# Patient Record
Sex: FEMALE | Race: BLACK OR AFRICAN AMERICAN | NOT HISPANIC OR LATINO | Employment: FULL TIME | URBAN - METROPOLITAN AREA
[De-identification: names, ages, dates, MRNs, and addresses within clinical notes are randomized per-mention and may not be internally consistent; named-entity substitution may affect disease eponyms.]

---

## 2017-01-27 ENCOUNTER — HOSPITAL ENCOUNTER (OUTPATIENT)
Dept: PHYSICAL THERAPY | Facility: HOSPITAL | Age: 47
Setting detail: THERAPIES SERIES
Discharge: HOME | End: 2017-01-27

## 2017-01-27 DIAGNOSIS — S96.912A STRAIN OF LEFT FOOT, INITIAL ENCOUNTER: Primary | ICD-10-CM

## 2017-01-27 DIAGNOSIS — M79.672 LEFT FOOT PAIN: ICD-10-CM

## 2017-01-27 PROCEDURE — 97110 THERAPEUTIC EXERCISES: CPT

## 2017-01-27 PROCEDURE — 97161 PT EVAL LOW COMPLEX 20 MIN: CPT

## 2017-01-27 PROCEDURE — 97033 APP MDLTY 1+IONTPHRSIS EA 15: CPT

## 2017-01-27 NOTE — PROGRESS NOTES
"    Outpatient Physical Therapy Ortho Initial Evaluation  ELENA SmithOsgood     Patient Name: Юлия Najera  : 1970  MRN: [id number]  Today's Date: 2017      Visit Date: 2017    There is no problem list on file for this patient.       Past Medical History   Diagnosis Date   • Arthritis    • Autoimmune disease    • Frequent headaches    • Hashimoto's disease         History reviewed. No pertinent past surgical history.    Visit Dx:     ICD-10-CM ICD-9-CM   1. Strain of left foot, initial encounter S96.912A 845.10   2. Left foot pain M79.672 729.5             Patient History       17 1300          History    Chief Complaint Difficulty Walking;Muscle tenderness;Pain  -LN      Type of Pain Foot pain   left lateral foot  -LN      Date Current Problem(s) Began 16  -LN      Brief Description of Current Complaint Patient was coming down off steps off the [employer] at work on 2016 and her left foot hit a trough and she rolled her ankle and foot. Patient had immediate pain and couldn't put much weight on it. She went to [facility] and had  X-ray which was normal per patient.  Patient diagnosed with left ankle and foot sprain. Patient was on walker for a couple days. Used ace bandage & now using PRN. Patient was doing well with no c/o pain and returned to work on full duty and began having pain return in lateral foot slowly and has progressively worsened; no ankle pain reported. Patient now on sit down duty. She had been back to work on full duty for about 2 weeks; Pain increases after a full day of work.   -LN      Previous treatment for THIS PROBLEM Medication  -LN      Onset Date- PT 2017  -LN      Patient/Caregiver Goals Relieve pain;Return to prior level of function;Return to work;Know what to do to help the symptoms   \"No pain and return to regular activities\"   -LN      Current Tobacco Use Smoker  -LN      Occupation/sports/leisure activities works at [employer]- presently " "on sit down duty at work; likes to paint and play video games  -LN      How has patient tried to help current problem? ibuprofen, biofreeze, massage, soaks, MH/CP.  has also tried home TENS unit- but it makes foot \"feel weird.\"   -LN      What clinical tests have you had for this problem? X-ray  -LN      Results of Clinical Tests normal per patient  -LN      Related/Recent Hospitalizations No  -LN      History of Previous Related Injuries none  -LN      Pain     Pain Location Foot   lateral left foot  -LN      Pain at Present 1  -LN      Pain at Best 0  -LN      Pain at Worst 10   worst in past week 5/10. 10/10 at time of injury.  -LN      Pain Frequency Intermittent   \"every other day pain\"  -LN      Pain Description Aching;Throbbing  -LN      What Performance Factors Make the Current Problem(s) WORSE? standing/walking/stairs/standing and pivoting  -LN      What Performance Factors Make the Current Problem(s) BETTER? sitting/rest  -LN      Tolerance Time- Standing limited  -LN      Tolerance Time- Sitting no difficulty  -LN      Tolerance Time- Walking limited  -LN      Is your sleep disturbed? No  -LN      Difficulties at work? yes- with walking/standing/steps/standing and pivoting  -LN      Difficulties with ADL's? none reported  -LN      Difficulties with recreational activities? none  -LN      Fall Risk Assessment    Any falls in the past year: Yes  -LN      Number of falls reported in the last 12 months 1  -LN      Factors that contributed to the fall: Uneven surface   step at work  -LN      Services    Prior Rehab/Home Health Experiences Yes  -LN      Where was the prior experience with Rehab/Home Health on shoulder  -LN      Are you currently receiving Home Health services No  -LN      Do you plan to receive Home Health services in the near future No  -LN      Daily Activities    Primary Language English  -LN      Are you able to read Yes  -LN      Are you able to write Yes  -LN      How does patient learn " "best? Listening;Demonstration  -LN      Teaching needs identified Home Exercise Program   Risks and benefits of treatment explained to patient  -LN      Patient is concerned about/has problems with Climbing Stairs;Performing home management (household chores, shopping, care of dependents);Performing job responsibilities/community activities (work, school,;Standing;Walking  -LN      Does patient have problems with the following? Depression;Anxiety  -LN      Barriers to learning None  -LN      Pt Participated in POC and Goals Yes  -LN      Safety    Are you being hurt, hit, or frightened by anyone at home or in your life? No  -LN      Are you being neglected by a caregiver No  -LN        User Key  (r) = Recorded By, (t) = Taken By, (c) = Cosigned By    Initials Name Provider Type    LN Meli Weinstein, PT Physical Therapist                PT Ortho       01/27/17 1400    Subjective Comments    Subjective Comments \"It doesn't hurt all the time; it just depends on what I do that day; it's more like an every other day pain.\" \"It has never really swelled and it didn't bruise.\"   -LN    Precautions and Contraindications    Precautions/Limitations no known precautions/limitations  -LN    Subjective Pain    Able to rate subjective pain? yes  -LN    Pre-Treatment Pain Level 1  -LN    Post-Treatment Pain Level 1  -LN    Subjective Pain Comment c/o minimal itching in foot after ionto.   -LN    Pain Assessment    Pain Assessment 0-10  -LN    Sensation    Sensation WNL? WFL  -LN    Light Touch No apparent deficits  -LN    Additional Comments no c/o N/T in foot \"lately\" Did have problems with foot feeling really cold- that is gone now.   -LN    Foot/Ankle Palpation    Lateral Foot Left:;Tender;Swollen  -LN    Peroneals Left:;Tender   longus and brevis  -LN    Cuneiform Left:;Tender;Swollen   lateral  -LN    Ankle Accessory Motions    Distal tib/fib A-P glide Left:;WNL  -LN    Talocrural (talotibial) distraction Left:;WNL  " -LN    Talocrural (talotibial) A-P glide Left:;WNL  -LN    Subtalar distraction Left:;WNL  -LN    Subtalar medial/lateral tilt Left:;WNL  -LN    Mid-tarsal dorsal/plantar glide Left:;WNL;Left pain  -LN    Toes Accessory Motion    Toes Accessory Motions Tested? --   toe ROM WNL  -LN    Ankle/Foot Special Tests    Anterior drawer (ATFL lesion) Negative  -LN    Talar tilt test (instability) Negative  -LN    White test (Achilles’ tendon rupture) Negative  -LN    Amairani’s sign (DVT) Negative  -LN    Tib/Fib Compression Negative  -LN    Inversion Stress Test Left:;Positive   pain  -LN    Eversion Stress Test Left:;Positive   pain  -LN    Left Ankle    Dorsiflexion AROM Deficit -1 degrees  -LN    Plantarflexion AROM Deficit 64 degrees  -LN    Inversion AROM Deficit 30 degrees with pain  -LN    Eversion AROM Deficit 12 degrees with pain  -LN    Right Ankle    Dorsiflexion AROM Deficit 3 degrees  -LN    Plantarflexion AROM Deficit 64 degrees   -LN    Inversion AROM Deficit 30 degrees   -LN    Eversion AROM Deficit 16 degrees  -LN    Left Ankle/Foot    Ankle PF Gross Movement (5/5) normal  -LN    Ankle Dorsiflexion Gross Movement (5/5) normal  -LN    Subtalar Inversion Gross Movement (4-/5) good minus   discomfort  -LN    Subtalar Eversion Gross Movement (3/5) fair   pain  -LN    Right Ankle/Foot    Ankle PF Gross Movement (5/5) normal  -LN    Ankle Dorsiflexion Gross Movement (5/5) normal  -LN    Subtalar Inversion Gross Movement (5/5) normal  -LN    Subtalar Eversion Gross Movement (5/5) normal  -LN    Lower Extremity Flexibility    Hamstrings Moderately limited;Bilateral:  -LN    Gastrocnemius Left:;Moderately limited  -LN    Ankle Girth    Mid Tarsal- Right 25.6 cm   1 inch below ankle  -LN    Mid Tarsal- Left 26.5   1 inch below ankle  -LN    Above Malleolus- Right 27 cm   midankle  -LN    Above Malleolus- Left 27.5 cm   midankle  -LN    Ankle Girth Other 1 right & left 2 inches below ankle= 26 cm.   -LN    Gait  Assessment/Treatment    Gait, [place] Level independent  -LN    Gait, Assistive Device other (see comments)   none  -LN    Gait, Gait Deviations left:;antalgic   minimal  -LN    Gait, Impairments decreased flexibility;ROM decreased;strength decreased;pain  -LN      User Key  (r) = Recorded By, (t) = Taken By, (c) = Cosigned By    Initials Name Provider Type    LIZ Weinstein, PT Physical Therapist                            Therapy Education       01/27/17 1619    Therapy Education    Given --   First dose skin review completed after ionto. Normal skin reaction noted-  Nominal pinkness noted lateral left foot; Patient to remove tape and apply lotion as needed.   -LN      01/27/17 1601    Therapy Education    Given HEP;Symptoms/condition management;Pain management;Edema management   Patient to continue to use MH/biofreeze at home PRN.   -LN    Program New  -LN    How Provided Verbal;Demonstration;Written  -LN    Provided to Patient;Caregiver  -LN    Level of Understanding Teach back education performed;Verbalized;Demonstrated  -LN      User Key  (r) = Recorded By, (t) = Taken By, (c) = Cosigned By    Initials Name Provider Type    LIZ Weinstein, PT Physical Therapist                PT OP Goals       01/27/17 1400          PT Short Term Goals    STG Date to Achieve 02/10/17  -LN      STG 1 Patient to have improved left ankle AROM to 3 degrees df and 16 degrees eversion (= to right) & painfree.   -LN      STG 2 Patient able to stand for 45 minutes to 1 hour with left foot pain no >3/10.   -LN      STG 3 Patient to have improved left ankle strength by 1/2 muscle grade for inversion and eversion.   -LN      Long Term Goals    LTG Date to Achieve 02/24/17  -LN      LTG 1 Patient independent with HEP issued by therapist.  -LN      LTG 2 Patient to have improved left ankle strength to 4+-5/5 to avoid any future ankle & foot injuries.   -LN      LTG 3 Nominal tenderness noted lateral left foot.    -LN      LTG 4 Patient able to return to full duty at work and work a full day with left foot pain no > than 2/10.   -LN      Time Calculation    PT Goal Re-Cert Due Date 02/24/17  -LN        User Key  (r) = Recorded By, (t) = Taken By, (c) = Cosigned By    Initials Name Provider Type    LN Meli Weinstein, PT Physical Therapist                PT Assessment/Plan       01/27/17 1604          PT Assessment    Functional Limitations Impaired gait;Impaired locomotion;Limitation in home management;Performance in work activities;Limitations in community activities;Limitations in functional capacity and performance  -LN      Impairments Muscle strength;Pain;Impaired flexibility;Gait;Edema;Locomotion;Range of motion  -LN      Assessment Comments Patient presents 7 weeks s/p fall at work with left ankle and foot sprain; ankle is now better, but with persistent left foot pain (increased after she returned to full duty at work). Patient with decreased left ankle df and eversion, decreased strength in left ankle eversion and inversion, moderate tenderness lateral left foot/peroneus longus and brevis tendons, minimal edema, decreased standing and walking tolerance and minimal antalgic gait. Patient with signs of left lateral foot strain.   -LN      Please refer to paper survey for additional self-reported information Yes  -LN      Rehab Potential Good  -LN      Patient/caregiver participated in establishment of treatment plan and goals Yes  -LN      Patient would benefit from skilled therapy intervention Yes  -LN      PT Plan    PT Frequency 2x/week;3x/week  -LN      Predicted Duration of Therapy Intervention (days/wks) 2-4 weeks   -LN      Planned CPT's? PT EVAL: 21195;PT RE-EVAL: 11989;PT MANUAL THERAPY EA 15 MIN: 28467;PT THER PROC EA 15 MIN: 34607;PT ELECTRICAL STIM UNATTEND: ;PT ULTRASOUND EA 15 MIN: 28075;PT HOT OR COLD PACK TREAT MCARE;PT IONTOPHORESIS EA 15 MIN: 90749  -LN      Physical Therapy Interventions  "(Optional Details) stretching;strengthening;ROM (Range of Motion);manual therapy techniques;modalities;joint mobilization;patient/family education;home exercise program;taping  -LN      PT Plan Comments Assess benefit of Kinesiotaping and iontophoresis. Progress with exercises as tolerated.   -LN        User Key  (r) = Recorded By, (t) = Taken By, (c) = Cosigned By    Initials Name Provider Type    LIZ Weinstein, PT Physical Therapist                Modalities       01/27/17 1400          Iontophoresis 65753    Rx Minutes 10   to lateral left foot- peroneus brevis tendon area  -LN      Milliamps 4  -LN      MA/Min 40  -LN      Dexamethasone used Yes  -LN      Patch Type Medium   First dose skin review completed.  -LN      Other Treatment Provided    MA/min 40  -LN      Taping / Bracing Kinesiotape applied using 1 \"I\" strip beginning at lateral foot and going under foot, around heel (avoiding achilles tendon) and ending at lateral foot and 1 smaller \"I\" strip for spatial correction lateral foot. Instructed patient in use and care of kinesiotape, including safe removal of tape. Patient to leave tape on up to 5 days and to trim/remove tape as needed. Patient also instructed to try sleeping with a sock on to help keep tape on overnight.   -LN        User Key  (r) = Recorded By, (t) = Taken By, (c) = Cosigned By    Initials Name Provider Type    LIZ Weinstein, PT Physical Therapist              Exercises       01/27/17 1400          Subjective Comments    Subjective Comments \"It doesn't hurt all the time; it just depends on what I do that day; it's more like an every other day pain.\" \"It has never really swelled and it didn't bruise.\"   -LN      Subjective Pain    Able to rate subjective pain? yes  -LN      Pre-Treatment Pain Level 1  -LN      Post-Treatment Pain Level 1  -LN      Subjective Pain Comment c/o minimal itching in foot after ionto.   -LN      Exercise 1    Exercise Name 1 AP  -LN      " Reps 1 10  -LN      Exercise 2    Exercise Name 2 active inversion & eversion  -LN      Reps 2 10  -LN      Exercise 3    Exercise Name 3 gastroc stretch with sheet  -LN      Reps 3 5  -LN      Time (Seconds) 3 10 seconds  -LN      Exercise 4    Exercise Name 4 supine hamstring stretch with sheet  -LN      Reps 4 5  -LN      Time (Seconds) 4 10 seconds  -LN        User Key  (r) = Recorded By, (t) = Taken By, (c) = Cosigned By    Initials Name Provider Type    LIZ Weinstein PT Physical Therapist                              Outcome Measures       01/27/17 1600          Lower Extremity Functional Index    Any of your usual work, housework or school activities 2  -LN      Your usual hobbies, recreational or sporting activities 4  -LN      Getting into or out of the bath 3  -LN      Walking between rooms 3  -LN      Putting on your shoes or socks 4  -LN      Squatting 2  -LN      Lifting an object, like a bag of groceries from the floor 4  -LN      Performing light activities around your home 4  -LN      Performing heavy activities around your home 3  -LN      Getting into or out of a car 4  -LN      Walking 2 blocks 4  -LN      Walking a mile 2  -LN      Going up or down 10 stairs (about 1 flight of stairs) 2  -LN      Standing for 1 hour 2  -LN      Sitting for 1 hour 4  -LN      Running on even ground 3  -LN      Running on uneven ground 3  -LN      Making sharp turns while running fast 2  -LN      Hopping 2  -LN      Rolling over in bed 4  -LN      Total 61  -LN      Functional Assessment    Outcome Measure Options Lower Extremity Functional Scale (LEFS)  -LN        User Key  (r) = Recorded By, (t) = Taken By, (c) = Cosigned By    Initials Name Provider Type    LIZ Weinstein PT Physical Therapist            Time Calculation:   Start Time: 1400  Stop Time: 1500  Time Calculation (min): 60 min     Therapy Charges for Today     Code Description Service Date Service Provider Modifiers Qty     98078464608 HC PT EVAL LOW COMPLEXITY 2 1/27/2017 Meli Weinstein, PT GP 1    01374625046 HC PT IONTOPHORESIS EA 15 MIN 1/27/2017 Meli Weinstein, PT GP 1    28984299362 HC PT THER PROC EA 15 MIN 1/27/2017 Meli Weinstein, PT GP 1          PT G-Codes  Outcome Measure Options: Lower Extremity Functional Scale (LEFS)         Meli Weinstein, PT  1/27/2017

## 2017-01-30 ENCOUNTER — HOSPITAL ENCOUNTER (OUTPATIENT)
Dept: PHYSICAL THERAPY | Facility: HOSPITAL | Age: 47
Setting detail: THERAPIES SERIES
Discharge: HOME | End: 2017-01-30

## 2017-01-30 PROCEDURE — 97110 THERAPEUTIC EXERCISES: CPT | Performed by: PHYSICAL THERAPIST

## 2017-01-30 NOTE — PROGRESS NOTES
"    Outpatient Physical Therapy Ortho Treatment Note  ELENA Wahl     Patient Name: Юлия Najera  : 1970  MRN: [id number]  Today's Date: 2017      Visit Date: 2017    Visit Dx:  No diagnosis found.    There is no problem list on file for this patient.       Past Medical History   Diagnosis Date   • Arthritis    • Autoimmune disease    • Frequent headaches    • Hashimoto's disease         No past surgical history on file.          PT Ortho       17 0650    Subjective Comments    Subjective Comments Patient reports she continues to have pain along lateral aspect of foot.  States she \"thinks\" tape may have helped some as she felt more supported, however she has been standing/walking quite a bit at work despite being on \"sit\" duty  -SP      User Key  (r) = Recorded By, (t) = Taken By, (c) = Cosigned By    Initials Name Provider Type    SP Shari Lopez, PT Physical Therapist                            PT Assessment/Plan       17 1826 17 1604       PT Assessment    Functional Limitations  Impaired gait;Impaired locomotion;Limitation in home management;Performance in work activities;Limitations in community activities;Limitations in functional capacity and performance  -LN     Impairments  Muscle strength;Pain;Impaired flexibility;Gait;Edema;Locomotion;Range of motion  -LN     Assessment Comments Patient continues to have significant tenderness to palpation along lateral aspect of left foot, but able to tolerate ther-ex without increased symptoms  -SP Patient presents 7 weeks s/p fall at work with left ankle and foot sprain; ankle is now better, but with persistent left foot pain (increased after she returned to full duty at work). Patient with decreased left ankle df and eversion, decreased strength in left ankle eversion and inversion, moderate tenderness lateral left foot/peroneus longus and brevis tendons, minimal edema, decreased standing and walking tolerance and " "minimal antalgic gait. Patient with signs of left lateral foot strain.   -LN     Please refer to paper survey for additional self-reported information  Yes  -LN     Rehab Potential  Good  -LN     Patient/caregiver participated in establishment of treatment plan and goals  Yes  -LN     Patient would benefit from skilled therapy intervention  Yes  -LN     PT Plan    PT Frequency  2x/week;3x/week  -LN     Predicted Duration of Therapy Intervention (days/wks)  2-4 weeks   -LN     Planned CPT's?  PT EVAL: 56148;PT RE-EVAL: 80076;PT MANUAL THERAPY EA 15 MIN: 07970;PT THER PROC EA 15 MIN: 58502;PT ELECTRICAL STIM UNATTEND: ;PT ULTRASOUND EA 15 MIN: 37882;PT HOT OR COLD PACK TREAT MCARE;PT IONTOPHORESIS EA 15 MIN: 10891  -LN     Physical Therapy Interventions (Optional Details)  stretching;strengthening;ROM (Range of Motion);manual therapy techniques;modalities;joint mobilization;patient/family education;home exercise program;taping  -LN     PT Plan Comments Progress foot/ankle strengthening as tolerable  -SP Assess benefit of Kinesiotaping and iontophoresis. Progress with exercises as tolerated.   -LN       User Key  (r) = Recorded By, (t) = Taken By, (c) = Cosigned By    Initials Name Provider Type    SP Shari Lopez, PT Physical Therapist    LN Meli Weinstein, PT Physical Therapist                Modalities       01/30/17 1530          Moist Heat    MH Applied Yes  -SP      Location left gastroc and foot  -SP      Rx Minutes 12 mins  -SP      MH Prior to Rx Yes  -SP      Iontophoresis 33506    Rx Minutes 10   to lateral left foot- peroneus brevis tendon area  -SP      Milliamps 4  -SP      MA/Min 40  -SP      Dexamethasone used Yes  -SP      Patch Type Medium   First dose skin review completed.  -SP      Other Treatment Provided    MA/min 40  -SP      Taping / Bracing Kinesiotape applied using 1 \"I\" strip beginning at lateral foot and going under foot, around heel (avoiding achilles tendon) and " "ending at lateral foot and 1 smaller \"I\" strip for spatial correction lateral foot. Instructed patient in use and care of kinesiotape, including safe removal of tape. Patient to leave tape on up to 5 days and to trim/remove tape as needed. Patient also instructed to try sleeping with a sock on to help keep tape on overnight.   -SP        User Key  (r) = Recorded By, (t) = Taken By, (c) = Cosigned By    Initials Name Provider Type    JEANETTE Lopez, PT Physical Therapist                Exercises       01/30/17 1530          Subjective Comments    Subjective Comments Patient reports she continues to have pain along lateral aspect of foot.  States she \"thinks\" tape may have helped some as she felt more supported, however she has been standing/walking quite a bit at work despite being on \"sit\" duty  -SP      Exercise 1    Exercise Name 1 AP  -SP      Reps 1 15  -SP      Exercise 2    Exercise Name 2 active inversion & eversion  -SP      Reps 2 15  -SP      Exercise 3    Exercise Name 3 gastroc stretch with sheet  -SP      Reps 3 5  -SP      Time (Seconds) 3 10 seconds  -SP      Exercise 4    Exercise Name 4 supine hamstring stretch with sheet  -SP      Reps 4 5  -SP      Time (Seconds) 4 10 seconds  -SP        User Key  (r) = Recorded By, (t) = Taken By, (c) = Cosigned By    Initials Name Provider Type    JEANETTE Lopez, PT Physical Therapist                               PT OP Goals       01/27/17 1400          PT Short Term Goals    STG Date to Achieve 02/10/17  -LN      STG 1 Patient to have improved left ankle AROM to 3 degrees df and 16 degrees eversion (= to right) & painfree.   -LN      STG 2 Patient able to stand for 45 minutes to 1 hour with left foot pain no >3/10.   -LN      STG 3 Patient to have improved left ankle strength by 1/2 muscle grade for inversion and eversion.   -LN      Long Term Goals    LTG Date to Achieve 02/24/17  -LN      LTG 1 Patient independent with HEP issued by " therapist.  -LN      LTG 2 Patient to have improved left ankle strength to 4+-5/5 to avoid any future ankle & foot injuries.   -LN      LTG 3 Nominal tenderness noted lateral left foot.   -LN      LTG 4 Patient able to return to full duty at work and work a full day with left foot pain no > than 2/10.   -LN      Time Calculation    PT Goal Re-Cert Due Date 02/24/17  -LN        User Key  (r) = Recorded By, (t) = Taken By, (c) = Cosigned By    Initials Name Provider Type    LIZ Weinstein, PT Physical Therapist                Therapy Education       01/27/17 1619    Therapy Education    Given --   First dose skin review completed after ionto. Normal skin reaction noted-  Nominal pinkness noted lateral left foot; Patient to remove tape and apply lotion as needed.   -LN      01/27/17 1601    Therapy Education    Given HEP;Symptoms/condition management;Pain management;Edema management   Patient to continue to use MH/biofreeze at home PRN.   -LN    Program New  -LN    How Provided Verbal;Demonstration;Written  -LN    Provided to Patient;Caregiver  -LN    Level of Understanding Teach back education performed;Verbalized;Demonstrated  -LN      User Key  (r) = Recorded By, (t) = Taken By, (c) = Cosigned By    Initials Name Provider Type    LIZ Weinstein, PT Physical Therapist                Time Calculation:   Start Time: 1530  Stop Time: 1630  Time Calculation (min): 60 min    Therapy Charges for Today     Code Description Service Date Service Provider Modifiers Qty    35771636500 HC PT HOT OR COLD PACK TREAT MCARE 1/30/2017 Shari Lopez, PT GP 1    62170690268 HC PT THER PROC EA 15 MIN 1/30/2017 Shari Lopez, PT GP 1                    Shari Lopez, PT  1/30/2017

## 2017-02-02 ENCOUNTER — HOSPITAL ENCOUNTER (OUTPATIENT)
Dept: PHYSICAL THERAPY | Facility: HOSPITAL | Age: 47
Setting detail: THERAPIES SERIES
Discharge: HOME | End: 2017-02-02

## 2017-02-02 DIAGNOSIS — S96.912A STRAIN OF LEFT FOOT, INITIAL ENCOUNTER: Primary | ICD-10-CM

## 2017-02-02 DIAGNOSIS — M79.672 LEFT FOOT PAIN: ICD-10-CM

## 2017-02-02 PROCEDURE — 97033 APP MDLTY 1+IONTPHRSIS EA 15: CPT

## 2017-02-02 PROCEDURE — 97110 THERAPEUTIC EXERCISES: CPT

## 2017-02-02 NOTE — PROGRESS NOTES
"    Outpatient Physical Therapy Ortho Treatment Note  ELENA Wahl     Patient Name: Юлия Najera  : 1970  MRN: [id number]  Today's Date: 2017      Visit Date: 2017    Visit Dx:    ICD-10-CM ICD-9-CM   1. Strain of left foot, initial encounter S96.912A 845.10   2. Left foot pain M79.672 729.5       There is no problem list on file for this patient.       Past Medical History   Diagnosis Date   • Arthritis    • Autoimmune disease    • Frequent headaches    • Hashimoto's disease         No past surgical history on file.          PT Ortho       17 1500    Subjective Comments    Subjective Comments Patient reports that her foot is doing better, but \"it's  on the outside of my foot.\" She reports that the tape does seem to help.   -LN    Precautions and Contraindications    Precautions/Limitations no known precautions/limitations  -LN      User Key  (r) = Recorded By, (t) = Taken By, (c) = Cosigned By    Initials Name Provider Type    LN Meli Weinstein, PT Physical Therapist                            PT Assessment/Plan       17 1500 17 1826 17 1604    PT Assessment    Functional Limitations   Impaired gait;Impaired locomotion;Limitation in home management;Performance in work activities;Limitations in community activities;Limitations in functional capacity and performance  -LN    Impairments   Muscle strength;Pain;Impaired flexibility;Gait;Edema;Locomotion;Range of motion  -LN    Assessment Comments Patient with overall decreased pain lateral left foot , but still very tender peroneus tendon area. She tolerates exercises well and does report benefit with Kinesiotape on foot.   -LN Patient continues to have significant tenderness to palpation along lateral aspect of left foot, but able to tolerate ther-ex without increased symptoms  -SP Patient presents 7 weeks s/p fall at work with left ankle and foot sprain; ankle is now better, but with persistent left " foot pain (increased after she returned to full duty at work). Patient with decreased left ankle df and eversion, decreased strength in left ankle eversion and inversion, moderate tenderness lateral left foot/peroneus longus and brevis tendons, minimal edema, decreased standing and walking tolerance and minimal antalgic gait. Patient with signs of left lateral foot strain.   -LN    Please refer to paper survey for additional self-reported information   Yes  -LN    Rehab Potential   Good  -LN    Patient/caregiver participated in establishment of treatment plan and goals   Yes  -LN    Patient would benefit from skilled therapy intervention   Yes  -LN    PT Plan    PT Frequency 2x/week;3x/week  -LN  2x/week;3x/week  -LN    Predicted Duration of Therapy Intervention (days/wks)   2-4 weeks   -LN    Planned CPT's?   PT EVAL: 70075;PT RE-EVAL: 32796;PT MANUAL THERAPY EA 15 MIN: 62093;PT THER PROC EA 15 MIN: 39528;PT ELECTRICAL STIM UNATTEND: ;PT ULTRASOUND EA 15 MIN: 78259;PT HOT OR COLD PACK TREAT MCARE;PT IONTOPHORESIS EA 15 MIN: 02638  -LN    Physical Therapy Interventions (Optional Details)   stretching;strengthening;ROM (Range of Motion);manual therapy techniques;modalities;joint mobilization;patient/family education;home exercise program;taping  -LN    PT Plan Comments Continue per P.O.C. Progress with exercises as tolerated.   -LN Progress foot/ankle strengthening as tolerable  -SP Assess benefit of Kinesiotaping and iontophoresis. Progress with exercises as tolerated.   -LN      User Key  (r) = Recorded By, (t) = Taken By, (c) = Cosigned By    Initials Name Provider Type    SP Shari Lopez, PT Physical Therapist    LN Meli Weinstein, PT Physical Therapist                Modalities       02/02/17 1500          Moist Heat    MH Applied Yes  -LN      Location left gastroc and foot  -LN      Rx Minutes 12 mins  -LN      MH Prior to Rx Yes   prior to iontophoresis  -LN      Iontophoresis 02155     "Rx Minutes 13   to lateral left foot- peroneus brevis tendon area  -LN      Milliamps 3  -LN      MA/Min 40  -LN      Dexamethasone used Yes  -LN      Patch Type Medium   First dose skin review completed.  -LN      Other Treatment Provided    MA/min 39  -LN      Taping / Bracing Kinesiotape applied using 1 \"I\" strip beginning at left lateral foot and going under foot, around heel (avoiding achilles tendon) and ending at lateral foot and 1 smaller \"I\" strip for spatial correction lateral foot. Instructed patient in use and care of kinesiotape, including safe removal of tape. Patient to leave tape on up to 5 days and to trim/remove tape as needed. Patient also instructed to try sleeping with a sock on to help keep tape on overnight.   -LN        User Key  (r) = Recorded By, (t) = Taken By, (c) = Cosigned By    Initials Name Provider Type    LIZ Weinstein, PT Physical Therapist                Exercises       02/02/17 1500          Subjective Comments    Subjective Comments Patient reports that her foot is doing better, but \"it's  on the outside of my foot.\" She reports that the tape does seem to help.   -LN      Exercise 1    Exercise Name 1 AP  -LN      Reps 1 15  -LN      Exercise 2    Exercise Name 2 active inversion & eversion  -LN      Reps 2 15  -LN      Exercise 3    Exercise Name 3 NWB gastroc stretch with sheet  -LN      Reps 3 5  -LN      Time (Seconds) 3 10 seconds  -LN      Exercise 4    Exercise Name 4 supine hamstring stretch with sheet  -LN      Reps 4 5  -LN      Time (Seconds) 4 10 seconds  -LN      Exercise 5    Exercise Name 5 NWB soleus stretch with sheet  -LN      Reps 5 5  -LN      Time (Seconds) 5 10 seconds  -LN      Exercise 6    Exercise Name 6 toe curls on towel  -LN      Time (Minutes) 6 3 minutes  -LN        User Key  (r) = Recorded By, (t) = Taken By, (c) = Cosigned By    Initials Name Provider Type    LIZ Weinstein, PT Physical Therapist                 "               PT OP Goals       01/27/17 1400          PT Short Term Goals    STG Date to Achieve 02/10/17  -LN      STG 1 Patient to have improved left ankle AROM to 3 degrees df and 16 degrees eversion (= to right) & painfree.   -LN      STG 2 Patient able to stand for 45 minutes to 1 hour with left foot pain no >3/10.   -LN      STG 3 Patient to have improved left ankle strength by 1/2 muscle grade for inversion and eversion.   -LN      Long Term Goals    LTG Date to Achieve 02/24/17  -LN      LTG 1 Patient independent with HEP issued by therapist.  -LN      LTG 2 Patient to have improved left ankle strength to 4+-5/5 to avoid any future ankle & foot injuries.   -LN      LTG 3 Nominal tenderness noted lateral left foot.   -LN      LTG 4 Patient able to return to full duty at work and work a full day with left foot pain no > than 2/10.   -LN      Time Calculation    PT Goal Re-Cert Due Date 02/24/17  -LN        User Key  (r) = Recorded By, (t) = Taken By, (c) = Cosigned By    Initials Name Provider Type    LN Meli Weinstein, PT Physical Therapist                Therapy Education       02/02/17 1602    Therapy Education    Given HEP;Symptoms/condition management;Pain management  -LN    Program New   added NWB soleus stretch with sheet and toe curls with towel  -LN    How Provided Verbal;Demonstration  -LN    Provided to Patient  -LN    Level of Understanding Teach back education performed;Verbalized;Demonstrated  -LN      01/27/17 1619    Therapy Education    Given --   First dose skin review completed after ionto. Normal skin reaction noted-  Nominal pinkness noted lateral left foot; Patient to remove tape and apply lotion as needed.   -LN      01/27/17 1601    Therapy Education    Given HEP;Symptoms/condition management;Pain management;Edema management   Patient to continue to use MH/biofreeze at home PRN.   -LN    Program New  -LN    How Provided Verbal;Demonstration;Written  -LN    Provided to  Patient;Caregiver  -LN    Level of Understanding Teach back education performed;Verbalized;Demonstrated  -LN      User Key  (r) = Recorded By, (t) = Taken By, (c) = Cosigned By    Initials Name Provider Type    LN Meli Weinstein, PT Physical Therapist                Time Calculation:   Start Time: 1500  Stop Time: 1555  Time Calculation (min): 55 min    Therapy Charges for Today     Code Description Service Date Service Provider Modifiers Qty    90888124559 HC PT HOT OR COLD PACK TREAT MCARE 2/2/2017 Meli Weinstein, PT GP 1    20215163591 HC PT IONTOPHORESIS EA 15 MIN 2/2/2017 Meli Weinstein, PT GP 1    97500337185 HC PT THER PROC EA 15 MIN 2/2/2017 Meli Weinstein, PT GP 1                    Meli Weinstein, PT  2/2/2017

## 2017-02-03 ENCOUNTER — HOSPITAL ENCOUNTER (OUTPATIENT)
Dept: PHYSICAL THERAPY | Facility: HOSPITAL | Age: 47
Setting detail: THERAPIES SERIES
Discharge: HOME | End: 2017-02-03

## 2017-02-03 DIAGNOSIS — S96.912A STRAIN OF LEFT FOOT, INITIAL ENCOUNTER: Primary | ICD-10-CM

## 2017-02-03 DIAGNOSIS — M79.672 LEFT FOOT PAIN: ICD-10-CM

## 2017-02-03 PROCEDURE — 97033 APP MDLTY 1+IONTPHRSIS EA 15: CPT

## 2017-02-03 PROCEDURE — 97110 THERAPEUTIC EXERCISES: CPT

## 2017-02-03 NOTE — PROGRESS NOTES
"    Outpatient Physical Therapy Ortho Treatment Note  ELENA Wahl     Patient Name: Юлия Najera  : 1970  MRN: [id number]  Today's Date: 2/3/2017      Visit Date: 2017    Visit Dx:    ICD-10-CM ICD-9-CM   1. Strain of left foot, initial encounter S96.912A 845.10   2. Left foot pain M79.672 729.5       There is no problem list on file for this patient.       Past Medical History   Diagnosis Date   • Arthritis    • Autoimmune disease    • Frequent headaches    • Hashimoto's disease         No past surgical history on file.          PT Ortho       17 1500    Subjective Comments    Subjective Comments Patient reports that her foot isn't really hurting, but \"I feel a strain on the outside of that foot.\"   -LN    Precautions and Contraindications    Precautions/Limitations no known precautions/limitations  -LN      17 1500    Subjective Comments    Subjective Comments Patient reports that her foot is doing better, but \"it's  on the outside of my foot.\" She reports that the tape does seem to help.   -LN    Precautions and Contraindications    Precautions/Limitations no known precautions/limitations  -LN      User Key  (r) = Recorded By, (t) = Taken By, (c) = Cosigned By    Initials Name Provider Type    LN Meli Weinstein, PT Physical Therapist                            PT Assessment/Plan       17 1600 17 1500 17 1826    PT Assessment    Assessment Comments Patient continues with overall decreased pain lateral left foot, but still with tenderness noted. She is progressing well with exercises, but did have a little difficulty with TB exercises secondary to discomfort/pulling lateral foot/peroneal tendons. Decreased antalgic gait but does report benefit with tape on.    -LN Patient with overall decreased pain lateral left foot , but still very tender peroneus tendon area. She tolerates exercises well and does report benefit with Kinesiotape on foot.   -LN " "Patient continues to have significant tenderness to palpation along lateral aspect of left foot, but able to tolerate ther-ex without increased symptoms  -SP    PT Plan    PT Frequency 2x/week;3x/week  -LN 2x/week;3x/week  -LN     PT Plan Comments Continue per P.O.C. Patient sees MD on Tuesday, 02/07/2017.   -LN Continue per P.O.C. Progress with exercises as tolerated.   -LN Progress foot/ankle strengthening as tolerable  -SP      User Key  (r) = Recorded By, (t) = Taken By, (c) = Cosigned By    Initials Name Provider Type    SP Shari Lopez, PT Physical Therapist    LN Meli Weinstein, PT Physical Therapist                Modalities       02/03/17 1500          Moist Heat    MH Applied Yes  -LN      Location left gastroc and foot  -LN      Rx Minutes 12 mins  -LN      MH Prior to Rx Yes   -LN      Iontophoresis 33054    Rx Minutes 13   to lateral left foot- peroneus brevis tendon area  -LN      Milliamps 3  -LN      MA/Min 40  -LN      Dexamethasone used Yes  -LN      Patch Type Medium  -LN      Other Treatment Provided    MA/min 39  -LN      Taping / Bracing Kinesiotape applied using 1 \"I\" strip beginning at lateral foot and going under foot, around heel (avoiding achilles tendon) and ending at lateral foot and 1 smaller \"I\" strip for spatial correction lateral foot. Instructed patient in use and care of kinesiotape, including safe removal of tape. Patient to leave tape on up to 5 days and to trim/remove tape as needed. Patient also instructed to try sleeping with a sock on to help keep tape on overnight.   -LN        User Key  (r) = Recorded By, (t) = Taken By, (c) = Cosigned By    Initials Name Provider Type    LN Meli Weinstein, PT Physical Therapist                Exercises       02/03/17 1500          Subjective Comments    Subjective Comments Patient reports that her foot isn't really hurting, but \"I feel a strain on the outside of that foot.\"   -LN      Exercise 1    Exercise Name " 1 AP  -LN      Reps 1 15  -LN      Exercise 2    Exercise Name 2 active inversion & eversion  -LN      Reps 2 15  -LN      Exercise 3    Exercise Name 3 NWB gastroc stretch with sheet  -LN      Reps 3 5  -LN      Time (Seconds) 3 10 seconds  -LN      Exercise 4    Exercise Name 4 supine hamstring stretch with sheet  -LN      Reps 4 5  -LN      Time (Seconds) 4 10 seconds  -LN      Exercise 5    Exercise Name 5 NWB soleus stretch with sheet  -LN      Reps 5 5  -LN      Time (Seconds) 5 10 seconds  -LN      Exercise 6    Exercise Name 6 toe curls on towel  -LN      Time (Minutes) 6 3 minutes  -LN      Exercise 7    Exercise Name 7 ankle inversion vs. TB  -LN      Cueing 7 Verbal   tactile  -LN      Equipment 7 Theraband  -LN      Resistance 7 Red  -LN      Reps 7 5  -LN      Time (Seconds) 7 3 seconds  -LN      Exercise 8    Exercise Name 8 ankle eversion vs. TB  -LN      Cueing 8 Verbal   tactile  -LN      Equipment 8 Theraband  -LN      Resistance 8 Red  -LN      Reps 8 5  -LN      Time (Seconds) 8 3 seconds  -LN        User Key  (r) = Recorded By, (t) = Taken By, (c) = Cosigned By    Initials Name Provider Type    LN Meli Weinstein, PT Physical Therapist                               PT OP Goals       01/27/17 1400          PT Short Term Goals    STG Date to Achieve 02/10/17  -LN      STG 1 Patient to have improved left ankle AROM to 3 degrees df and 16 degrees eversion (= to right) & painfree.   -LN      STG 2 Patient able to stand for 45 minutes to 1 hour with left foot pain no >3/10.   -LN      STG 3 Patient to have improved left ankle strength by 1/2 muscle grade for inversion and eversion.   -LN      Long Term Goals    LTG Date to Achieve 02/24/17  -LN      LTG 1 Patient independent with HEP issued by therapist.  -LN      LTG 2 Patient to have improved left ankle strength to 4+-5/5 to avoid any future ankle & foot injuries.   -LN      LTG 3 Nominal tenderness noted lateral left foot.   -LN      LTG 4  Patient able to return to full duty at work and work a full day with left foot pain no > than 2/10.   -LN      Time Calculation    PT Goal Re-Cert Due Date 02/24/17  -LN        User Key  (r) = Recorded By, (t) = Taken By, (c) = Cosigned By    Initials Name Provider Type    LIZ Weinstein PT Physical Therapist                Therapy Education       02/03/17 1608    Therapy Education    Given HEP;Symptoms/condition management;Pain management  -LN    Program New   added ankle inversion and eversion vs. TB and issued red TB for home.   -LN    How Provided Verbal;Demonstration;Written  -LN    Provided to Patient  -LN    Level of Understanding Teach back education performed;Verbalized;Demonstrated  -LN      02/02/17 1602    Therapy Education    Given HEP;Symptoms/condition management;Pain management  -LN    Program New   added NWB soleus stretch with sheet and toe curls with towel  -LN    How Provided Verbal;Demonstration  -LN    Provided to Patient  -LN    Level of Understanding Teach back education performed;Verbalized;Demonstrated  -LN      01/27/17 1619    Therapy Education    Given --   First dose skin review completed after ionto. Normal skin reaction noted-  Nominal pinkness noted lateral left foot; Patient to remove tape and apply lotion as needed.   -LN      User Key  (r) = Recorded By, (t) = Taken By, (c) = Cosigned By    Initials Name Provider Type    LIZ Weinstein PT Physical Therapist                Time Calculation:   Start Time: 1500  Stop Time: 1550  Time Calculation (min): 50 min    Therapy Charges for Today     Code Description Service Date Service Provider Modifiers Qty    83483480876 HC PT HOT OR COLD PACK TREAT MCARE 2/3/2017 Meli Weinstein, PT GP 1    29413599320 HC PT IONTOPHORESIS EA 15 MIN 2/3/2017 Meli Weinstein, PT GP 1    80788051600 HC PT THER PROC EA 15 MIN 2/3/2017 Meli Weinstein, PT GP 1                    Meli Weinstein,  PT  2/3/2017

## 2017-02-06 ENCOUNTER — HOSPITAL ENCOUNTER (OUTPATIENT)
Dept: PHYSICAL THERAPY | Facility: HOSPITAL | Age: 47
Setting detail: THERAPIES SERIES
Discharge: HOME | End: 2017-02-06

## 2017-02-06 PROCEDURE — 97033 APP MDLTY 1+IONTPHRSIS EA 15: CPT | Performed by: PHYSICAL THERAPIST

## 2017-02-06 PROCEDURE — 97110 THERAPEUTIC EXERCISES: CPT | Performed by: PHYSICAL THERAPIST

## 2017-02-06 NOTE — PROGRESS NOTES
Outpatient Physical Therapy Ortho Treatment Note  ELENA Wahl     Patient Name: Юлия Najera  : 1970  MRN: [id number]  Today's Date: 2017      Visit Date: 2017    Visit Dx:  No diagnosis found.    There is no problem list on file for this patient.       Past Medical History   Diagnosis Date   • Arthritis    • Autoimmune disease    • Frequent headaches    • Hashimoto's disease         No past surgical history on file.          PT Ortho       17 1530    Subjective Comments    Subjective Comments Patient reports that she continues to have pain along lateral left foot.  Patient reports she has increased pain and difficulty with ambulation on stairs  -SP      User Key  (r) = Recorded By, (t) = Taken By, (c) = Cosigned By    Initials Name Provider Type    JEANETTE Lopez, PT Physical Therapist                            PT Assessment/Plan       17 1810 17 1600 17 1500    PT Assessment    Assessment Comments Patient able to tolerate progression of ther-ex today.  Decreased tenderness to palpation along lateral aspect of left foot/peroneal tendon area  -SP Patient continues with overall decreased pain lateral left foot, but still with tenderness noted. She is progressing well with exercises, but did have a little difficulty with TB exercises secondary to discomfort/pulling lateral foot/peroneal tendons. Decreased antalgic gait but does report benefit with tape on.    -LN Patient with overall decreased pain lateral left foot , but still very tender peroneus tendon area. She tolerates exercises well and does report benefit with Kinesiotape on foot.   -LN    PT Plan    PT Frequency  2x/week;3x/week  -LN 2x/week;3x/week  -LN    PT Plan Comments Continue per MD orders  -SP Continue per P.O.C. Patient sees MD on Tuesday, 2017.   -LN Continue per P.O.C. Progress with exercises as tolerated.   -LN      17 1826          PT Assessment    Assessment Comments  "Patient continues to have significant tenderness to palpation along lateral aspect of left foot, but able to tolerate ther-ex without increased symptoms  -SP      PT Plan    PT Plan Comments Progress foot/ankle strengthening as tolerable  -SP        User Key  (r) = Recorded By, (t) = Taken By, (c) = Cosigned By    Initials Name Provider Type    JEANETTE Lopez, PT Physical Therapist    LN Meli Weinstein, PT Physical Therapist                Modalities       02/06/17 1530          Moist Heat    MH Applied Yes  -SP      Location left gastroc and foot  -SP      Rx Minutes 12 mins  -SP      MH Prior to Rx Yes  -SP      Iontophoresis 71347    Rx Minutes 13   to lateral left foot- peroneus brevis tendon area  -SP      Milliamps 3  -SP      MA/Min 40  -SP      Dexamethasone used Yes  -SP      Patch Type Medium  -SP      Other Treatment Provided    MA/min 39  -SP      Taping / Bracing Kinesiotape applied using 1 \"I\" strip beginning at lateral foot and going under foot, around heel (avoiding achilles tendon) and ending at lateral foot and 1 smaller \"I\" strip for spatial correction lateral foot. Instructed patient in use and care of kinesiotape, including safe removal of tape. Patient to leave tape on up to 5 days and to trim/remove tape as needed. Patient also instructed to try sleeping with a sock on to help keep tape on overnight.   -SP        User Key  (r) = Recorded By, (t) = Taken By, (c) = Cosigned By    Initials Name Provider Type    JEANETTE Lopez, PT Physical Therapist                Exercises       02/06/17 1530          Subjective Comments    Subjective Comments Patient reports that she continues to have pain along lateral left foot.  Patient reports she has increased pain and difficulty with ambulation on stairs  -SP      Exercise 1    Exercise Name 1 AP  -SP      Reps 1 20  -SP      Exercise 2    Exercise Name 2 active inversion & eversion  -SP      Reps 2 20  -SP      Exercise 3 "    Exercise Name 3 NWB gastroc stretch with sheet  -SP      Reps 3 5  -SP      Time (Seconds) 3 10 seconds  -SP      Exercise 4    Exercise Name 4 supine hamstring stretch with sheet  -SP      Reps 4 5  -SP      Time (Seconds) 4 10 seconds  -SP      Exercise 5    Exercise Name 5 NWB soleus stretch with sheet  -SP      Reps 5 5  -SP      Time (Seconds) 5 10 seconds  -SP      Exercise 6    Exercise Name 6 toe curls on towel  -SP      Time (Minutes) 6 3 minutes  -SP      Exercise 7    Exercise Name 7 ankle inversion vs. TB  -SP      Cueing 7 Verbal   tactile  -SP      Equipment 7 Theraband  -SP      Resistance 7 Red  -SP      Reps 7 15  -SP      Exercise 8    Exercise Name 8 ankle eversion vs. TB  -SP      Cueing 8 Verbal   tactile  -SP      Equipment 8 Theraband  -SP      Resistance 8 Red  -SP      Reps 8 15  -SP        User Key  (r) = Recorded By, (t) = Taken By, (c) = Cosigned By    Initials Name Provider Type    SP Shari Lopez, PT Physical Therapist                               PT OP Goals       01/27/17 1400          PT Short Term Goals    STG Date to Achieve 02/10/17  -LN      STG 1 Patient to have improved left ankle AROM to 3 degrees df and 16 degrees eversion (= to right) & painfree.   -LN      STG 2 Patient able to stand for 45 minutes to 1 hour with left foot pain no >3/10.   -LN      STG 3 Patient to have improved left ankle strength by 1/2 muscle grade for inversion and eversion.   -LN      Long Term Goals    LTG Date to Achieve 02/24/17  -LN      LTG 1 Patient independent with HEP issued by therapist.  -LN      LTG 2 Patient to have improved left ankle strength to 4+-5/5 to avoid any future ankle & foot injuries.   -LN      LTG 3 Nominal tenderness noted lateral left foot.   -LN      LTG 4 Patient able to return to full duty at work and work a full day with left foot pain no > than 2/10.   -LN      Time Calculation    PT Goal Re-Cert Due Date 02/24/17  -LN        User Key  (r) = Recorded By,  (t) = Taken By, (c) = Cosigned By    Initials Name Provider Type    LIZ Weinstein, PT Physical Therapist                Therapy Education       02/03/17 1608    Therapy Education    Given HEP;Symptoms/condition management;Pain management  -LN    Program New   added ankle inversion and eversion vs. TB and issued red TB for home.   -LN    How Provided Verbal;Demonstration;Written  -LN    Provided to Patient  -LN    Level of Understanding Teach back education performed;Verbalized;Demonstrated  -LN      02/02/17 1602    Therapy Education    Given HEP;Symptoms/condition management;Pain management  -LN    Program New   added NWB soleus stretch with sheet and toe curls with towel  -LN    How Provided Verbal;Demonstration  -LN    Provided to Patient  -LN    Level of Understanding Teach back education performed;Verbalized;Demonstrated  -LN      User Key  (r) = Recorded By, (t) = Taken By, (c) = Cosigned By    Initials Name Provider Type    LIZ Weinstein, PT Physical Therapist                Time Calculation:   Start Time: 1530  Stop Time: 1630  Time Calculation (min): 60 min    Therapy Charges for Today     Code Description Service Date Service Provider Modifiers Qty    55495994114 HC PT IONTOPHORESIS EA 15 MIN 2/6/2017 Shari Lopez, PT GP 1    49717122583 HC PT THER PROC EA 15 MIN 2/6/2017 Shari Lopez, PT GP 1    80383323518 HC PT HOT OR COLD PACK TREAT MCARE 2/6/2017 Shari Lopez, PT GP 1                    Shari Lopez, PT  2/6/2017

## 2017-02-09 ENCOUNTER — APPOINTMENT (OUTPATIENT)
Dept: PHYSICAL THERAPY | Facility: HOSPITAL | Age: 47
End: 2017-02-09

## 2017-02-16 ENCOUNTER — PREP FOR SURGERY (OUTPATIENT)
Dept: OBSTETRICS AND GYNECOLOGY | Facility: HOSPITAL | Age: 47
End: 2017-02-16

## 2017-02-16 DIAGNOSIS — N92.1 MENOMETRORRHAGIA: Primary | ICD-10-CM

## 2017-02-16 RX ORDER — SODIUM CHLORIDE 0.9 % (FLUSH) 0.9 %
1-10 SYRINGE (ML) INJECTION AS NEEDED
Status: CANCELLED | OUTPATIENT
Start: 2017-02-16

## 2017-02-24 ENCOUNTER — APPOINTMENT (OUTPATIENT)
Dept: PREADMISSION TESTING | Facility: HOSPITAL | Age: 47
End: 2017-02-24

## 2017-02-28 ENCOUNTER — APPOINTMENT (OUTPATIENT)
Dept: PREADMISSION TESTING | Facility: HOSPITAL | Age: 47
End: 2017-02-28
Attending: OBSTETRICS & GYNECOLOGY

## 2017-02-28 VITALS
OXYGEN SATURATION: 99 % | WEIGHT: 228 LBS | BODY MASS INDEX: 36.64 KG/M2 | SYSTOLIC BLOOD PRESSURE: 134 MMHG | HEART RATE: 73 BPM | RESPIRATION RATE: 16 BRPM | DIASTOLIC BLOOD PRESSURE: 84 MMHG | HEIGHT: 66 IN

## 2017-02-28 LAB
DEPRECATED RDW RBC AUTO: 43.8 FL (ref 37–54)
ERYTHROCYTE [DISTWIDTH] IN BLOOD BY AUTOMATED COUNT: 16.1 % (ref 11.5–14.5)
HCG SERPL QL: NEGATIVE
HCT VFR BLD AUTO: 26.5 % (ref 37–47)
HGB BLD-MCNC: 7.6 G/DL (ref 12–16)
MCH RBC QN AUTO: 21.4 PG (ref 27–31)
MCHC RBC AUTO-ENTMCNC: 28.7 G/DL (ref 31–37)
MCV RBC AUTO: 74.6 FL (ref 81–99)
PLATELET # BLD AUTO: 248 10*3/MM3 (ref 140–500)
PMV BLD AUTO: 9.5 FL (ref 7.4–10.4)
RBC # BLD AUTO: 3.55 10*6/MM3 (ref 4.2–5.4)
WBC NRBC COR # BLD: 4.9 10*3/MM3 (ref 4.8–10.8)

## 2017-02-28 PROCEDURE — 93010 ELECTROCARDIOGRAM REPORT: CPT | Performed by: INTERNAL MEDICINE

## 2017-02-28 RX ORDER — IBUPROFEN 800 MG/1
800 TABLET ORAL EVERY 6 HOURS PRN
COMMUNITY

## 2017-03-01 ENCOUNTER — ANESTHESIA EVENT (OUTPATIENT)
Dept: PERIOP | Facility: HOSPITAL | Age: 47
End: 2017-03-01

## 2017-03-01 NOTE — PAT
CECY ADAIR CRNA NOTIFIED OF HX. OF HASHIMOTO'S DISEASE, PT. STOPPED HER MEDICATION ON HER OWN 1 YEAR AGO, ANESTHESIA OK WITH THIS FOR SURGERY AS LONG AS PT. IS ASYMPTOMATIC. JOEY PLAZA RN

## 2017-03-02 ENCOUNTER — HOSPITAL ENCOUNTER (OUTPATIENT)
Facility: HOSPITAL | Age: 47
Setting detail: HOSPITAL OUTPATIENT SURGERY
Discharge: HOME | End: 2017-03-02
Attending: OBSTETRICS & GYNECOLOGY | Admitting: OBSTETRICS & GYNECOLOGY

## 2017-03-02 ENCOUNTER — ANESTHESIA (OUTPATIENT)
Dept: PERIOP | Facility: HOSPITAL | Age: 47
End: 2017-03-02

## 2017-03-02 VITALS
BODY MASS INDEX: 35.97 KG/M2 | DIASTOLIC BLOOD PRESSURE: 98 MMHG | HEART RATE: 60 BPM | RESPIRATION RATE: 14 BRPM | TEMPERATURE: 97.8 F | HEIGHT: 66 IN | OXYGEN SATURATION: 98 % | SYSTOLIC BLOOD PRESSURE: 141 MMHG | WEIGHT: 223.8 LBS

## 2017-03-02 DIAGNOSIS — N92.1 MENOMETRORRHAGIA: ICD-10-CM

## 2017-03-02 LAB — GLUCOSE BLDC GLUCOMTR-MCNC: 93 MG/DL (ref 70–130)

## 2017-03-02 PROCEDURE — 25010000002 KETOROLAC TROMETHAMINE PER 15 MG: Performed by: NURSE ANESTHETIST, CERTIFIED REGISTERED

## 2017-03-02 PROCEDURE — 25010000002 FENTANYL CITRATE (PF) 100 MCG/2ML SOLUTION: Performed by: NURSE ANESTHETIST, CERTIFIED REGISTERED

## 2017-03-02 PROCEDURE — 25010000002 HYDROMORPHONE PER 4 MG

## 2017-03-02 PROCEDURE — 82962 GLUCOSE BLOOD TEST: CPT

## 2017-03-02 PROCEDURE — 25010000002 PROPOFOL 10 MG/ML EMULSION: Performed by: NURSE ANESTHETIST, CERTIFIED REGISTERED

## 2017-03-02 PROCEDURE — 25010000002 MIDAZOLAM PER 1 MG: Performed by: ANESTHESIOLOGY

## 2017-03-02 PROCEDURE — 25010000002 ONDANSETRON PER 1 MG: Performed by: ANESTHESIOLOGY

## 2017-03-02 RX ORDER — HYDROMORPHONE HCL 110MG/55ML
PATIENT CONTROLLED ANALGESIA SYRINGE INTRAVENOUS
Status: COMPLETED
Start: 2017-03-02 | End: 2017-03-02

## 2017-03-02 RX ORDER — ACETAMINOPHEN 500 MG
500 TABLET ORAL EVERY 4 HOURS PRN
COMMUNITY

## 2017-03-02 RX ORDER — ONDANSETRON 2 MG/ML
4 INJECTION INTRAMUSCULAR; INTRAVENOUS ONCE AS NEEDED
Status: DISCONTINUED | OUTPATIENT
Start: 2017-03-02 | End: 2017-03-02 | Stop reason: HOSPADM

## 2017-03-02 RX ORDER — KETOROLAC TROMETHAMINE 30 MG/ML
INJECTION, SOLUTION INTRAMUSCULAR; INTRAVENOUS AS NEEDED
Status: DISCONTINUED | OUTPATIENT
Start: 2017-03-02 | End: 2017-03-02 | Stop reason: SURG

## 2017-03-02 RX ORDER — SODIUM CHLORIDE 0.9 % (FLUSH) 0.9 %
1-10 SYRINGE (ML) INJECTION AS NEEDED
Status: DISCONTINUED | OUTPATIENT
Start: 2017-03-02 | End: 2017-03-02 | Stop reason: HOSPADM

## 2017-03-02 RX ORDER — FENTANYL CITRATE 50 UG/ML
INJECTION, SOLUTION INTRAMUSCULAR; INTRAVENOUS AS NEEDED
Status: DISCONTINUED | OUTPATIENT
Start: 2017-03-02 | End: 2017-03-02 | Stop reason: SURG

## 2017-03-02 RX ORDER — MIDAZOLAM HYDROCHLORIDE 1 MG/ML
1 INJECTION INTRAMUSCULAR; INTRAVENOUS
Status: DISCONTINUED | OUTPATIENT
Start: 2017-03-02 | End: 2017-03-02 | Stop reason: HOSPADM

## 2017-03-02 RX ORDER — OXYCODONE HYDROCHLORIDE AND ACETAMINOPHEN 5; 325 MG/1; MG/1
1 TABLET ORAL ONCE AS NEEDED
Status: DISCONTINUED | OUTPATIENT
Start: 2017-03-02 | End: 2017-03-02 | Stop reason: HOSPADM

## 2017-03-02 RX ORDER — MORPHINE SULFATE 10 MG/ML
3 INJECTION INTRAMUSCULAR; INTRAVENOUS; SUBCUTANEOUS
Status: DISCONTINUED | OUTPATIENT
Start: 2017-03-02 | End: 2017-03-02 | Stop reason: HOSPADM

## 2017-03-02 RX ORDER — FAMOTIDINE 10 MG/ML
20 INJECTION, SOLUTION INTRAVENOUS
Status: DISCONTINUED | OUTPATIENT
Start: 2017-03-02 | End: 2017-03-02 | Stop reason: HOSPADM

## 2017-03-02 RX ORDER — LIDOCAINE HYDROCHLORIDE 20 MG/ML
INJECTION, SOLUTION INFILTRATION; PERINEURAL AS NEEDED
Status: DISCONTINUED | OUTPATIENT
Start: 2017-03-02 | End: 2017-03-02 | Stop reason: SURG

## 2017-03-02 RX ORDER — PROPOFOL 10 MG/ML
VIAL (ML) INTRAVENOUS AS NEEDED
Status: DISCONTINUED | OUTPATIENT
Start: 2017-03-02 | End: 2017-03-02 | Stop reason: SURG

## 2017-03-02 RX ORDER — LIDOCAINE HYDROCHLORIDE 10 MG/ML
INJECTION, SOLUTION EPIDURAL; INFILTRATION; INTRACAUDAL; PERINEURAL
Status: COMPLETED
Start: 2017-03-02 | End: 2017-03-02

## 2017-03-02 RX ORDER — HYDROMORPHONE HCL 110MG/55ML
0.5 PATIENT CONTROLLED ANALGESIA SYRINGE INTRAVENOUS
Status: DISCONTINUED | OUTPATIENT
Start: 2017-03-02 | End: 2017-03-02 | Stop reason: HOSPADM

## 2017-03-02 RX ORDER — MIDAZOLAM HYDROCHLORIDE 1 MG/ML
2 INJECTION INTRAMUSCULAR; INTRAVENOUS
Status: DISCONTINUED | OUTPATIENT
Start: 2017-03-02 | End: 2017-03-02 | Stop reason: HOSPADM

## 2017-03-02 RX ORDER — MEPERIDINE HYDROCHLORIDE 25 MG/ML
12.5 INJECTION INTRAMUSCULAR; INTRAVENOUS; SUBCUTANEOUS
Status: DISCONTINUED | OUTPATIENT
Start: 2017-03-02 | End: 2017-03-02 | Stop reason: HOSPADM

## 2017-03-02 RX ORDER — FAMOTIDINE 20 MG/1
20 TABLET, FILM COATED ORAL
Status: DISCONTINUED | OUTPATIENT
Start: 2017-03-02 | End: 2017-03-02 | Stop reason: HOSPADM

## 2017-03-02 RX ORDER — LIDOCAINE HYDROCHLORIDE 10 MG/ML
0.5 INJECTION, SOLUTION INFILTRATION; PERINEURAL ONCE AS NEEDED
Status: COMPLETED | OUTPATIENT
Start: 2017-03-02 | End: 2017-03-02

## 2017-03-02 RX ORDER — MAGNESIUM HYDROXIDE 1200 MG/15ML
LIQUID ORAL AS NEEDED
Status: DISCONTINUED | OUTPATIENT
Start: 2017-03-02 | End: 2017-03-02 | Stop reason: HOSPADM

## 2017-03-02 RX ORDER — SODIUM CHLORIDE, SODIUM LACTATE, POTASSIUM CHLORIDE, CALCIUM CHLORIDE 600; 310; 30; 20 MG/100ML; MG/100ML; MG/100ML; MG/100ML
1000 INJECTION, SOLUTION INTRAVENOUS CONTINUOUS PRN
Status: DISCONTINUED | OUTPATIENT
Start: 2017-03-02 | End: 2017-03-02 | Stop reason: HOSPADM

## 2017-03-02 RX ORDER — ONDANSETRON 2 MG/ML
4 INJECTION INTRAMUSCULAR; INTRAVENOUS ONCE AS NEEDED
Status: COMPLETED | OUTPATIENT
Start: 2017-03-02 | End: 2017-03-02

## 2017-03-02 RX ADMIN — SODIUM CHLORIDE, POTASSIUM CHLORIDE, SODIUM LACTATE AND CALCIUM CHLORIDE: 600; 310; 30; 20 INJECTION, SOLUTION INTRAVENOUS at 10:14

## 2017-03-02 RX ADMIN — LIDOCAINE HYDROCHLORIDE 60 MG: 20 INJECTION, SOLUTION INFILTRATION; PERINEURAL at 10:14

## 2017-03-02 RX ADMIN — MIDAZOLAM HYDROCHLORIDE 1 MG: 1 INJECTION, SOLUTION INTRAMUSCULAR; INTRAVENOUS at 09:55

## 2017-03-02 RX ADMIN — Medication 0.5 MG: at 11:10

## 2017-03-02 RX ADMIN — MIDAZOLAM HYDROCHLORIDE 1 MG: 1 INJECTION, SOLUTION INTRAMUSCULAR; INTRAVENOUS at 10:00

## 2017-03-02 RX ADMIN — PROPOFOL 150 MG: 10 INJECTION, EMULSION INTRAVENOUS at 10:14

## 2017-03-02 RX ADMIN — FAMOTIDINE 20 MG: 10 INJECTION, SOLUTION INTRAVENOUS at 09:54

## 2017-03-02 RX ADMIN — LIDOCAINE HYDROCHLORIDE 0.5 ML: 10 INJECTION, SOLUTION EPIDURAL; INFILTRATION; INTRACAUDAL; PERINEURAL at 08:25

## 2017-03-02 RX ADMIN — LIDOCAINE HYDROCHLORIDE 0.5 ML: 10 INJECTION, SOLUTION INFILTRATION; PERINEURAL at 08:25

## 2017-03-02 RX ADMIN — FENTANYL CITRATE 100 MCG: 50 INJECTION, SOLUTION INTRAMUSCULAR; INTRAVENOUS at 10:14

## 2017-03-02 RX ADMIN — SODIUM CHLORIDE, POTASSIUM CHLORIDE, SODIUM LACTATE AND CALCIUM CHLORIDE 1000 ML: 600; 310; 30; 20 INJECTION, SOLUTION INTRAVENOUS at 08:25

## 2017-03-02 RX ADMIN — HYDROMORPHONE HYDROCHLORIDE 0.5 MG: 2 INJECTION, SOLUTION INTRAMUSCULAR; INTRAVENOUS; SUBCUTANEOUS at 11:10

## 2017-03-02 RX ADMIN — KETOROLAC TROMETHAMINE 30 MG: 30 INJECTION, SOLUTION INTRAMUSCULAR; INTRAVENOUS at 10:30

## 2017-03-02 RX ADMIN — ONDANSETRON 4 MG: 2 INJECTION, SOLUTION INTRAMUSCULAR; INTRAVENOUS at 09:54

## 2017-03-02 NOTE — ANESTHESIA POSTPROCEDURE EVALUATION
Patient: Юлия Burns    Procedure Summary     Date Anesthesia Start Anesthesia Stop Room / Location    03/02/17 1011 1047 [facility] / [facility]       Procedure Diagnosis Surgeon Provider     DIAGNOSTIC HYSTEROSCOPY DILATATION AND  CURETTAGE (N/A Uterus) Menometrorrhagia  (Menometrorrhagia [N92.1]) MD Jose Murray CRNA          Anesthesia Type: general  Last vitals  BP      Temp      Pulse     Resp      SpO2        Post Anesthesia Care and Evaluation    Patient location during evaluation: PHASE II  Patient participation: complete - patient participated  Level of consciousness: awake and alert  Pain score: 0  Pain management: adequate  Airway patency: patent  Anesthetic complications: No anesthetic complications  PONV Status: none  Cardiovascular status: acceptable  Respiratory status: acceptable  Hydration status: acceptable  Post Neuraxial Block status: Motor and sensory function returned to baseline

## 2017-03-02 NOTE — ANESTHESIA PREPROCEDURE EVALUATION
Anesthesia Evaluation     Patient summary reviewed and Nursing notes reviewed   no history of anesthetic complications:  NPO Status: > 8 hours   Airway   Mallampati: II  TM distance: >3 FB  Neck ROM: full  possible difficult intubation  Dental - normal exam     Pulmonary - normal exam    breath sounds clear to auscultation  Sleep apnea: snores occ when really tired.    ROS comment: Smokers cough  Cardiovascular - normal exam  Exercise tolerance: good (4-7 METS)    Rhythm: regular  Rate: normal        Neuro/Psych  (+) headaches (frequent), dizziness/light headedness (vertigo),    GI/Hepatic/Renal/Endo    (+) obesity,  GERD (occ, with chili), hypothyroidism (Hashimotos thyroiditis),     Musculoskeletal     (+) back pain,   Abdominal   (+) obese,    Substance History   (+) alcohol use (occ),      OB/GYN negative ob/gyn ROS         Other   (+) arthritis (elbow, knees, hips, shoulders, foot)     Blood dyscrasia: anemia.                              Anesthesia Plan    ASA 2     general     intravenous induction   Anesthetic plan and risks discussed with patient and mother.  Use of blood products discussed with patient and mother  Consented to blood products.

## 2017-03-02 NOTE — ANESTHESIA PROCEDURE NOTES
Airway  Urgency: elective    Airway not difficult    General Information and Staff    Patient location during procedure: OR  CRNA: KULWINDER ADAIR    Indications and Patient Condition  Indications for airway management: airway protection    Preoxygenated: yes  MILS not maintained throughout  Mask difficulty assessment: 1 - vent by mask    Final Airway Details  Final airway type: supraglottic airway      Successful airway: classic  Size 4    Number of attempts at approach: 1    Additional Comments  Atraumatic.  LMA good seal

## 2017-03-02 NOTE — BRIEF OP NOTE
DILATATION AND CURETTAGE HYSTEROSCOPY  Procedure Note    Юлия Burns  3/2/2017    Pre-op Diagnosis:   Menometrorrhagia [N92.1]    Post-op Diagnosis:     Post-Op Diagnosis Codes:     * Menometrorrhagia [N92.1]    Procedure/CPT® Codes:      Procedure(s):  DILATATION AND CURETTAGE HYSTEROSCOPY    Surgeon(s):  Jake Mina MD    Anesthesia: Choice    Staff:   Circulator: Merissa Torres RN  Scrub Person: Caty Fuentes    Estimated Blood Loss: * No values recorded between 3/2/2017 10:10 AM and 3/2/2017 10:37 AM *  Urine Voided: * No values recorded between 3/2/2017 10:10 AM and 3/2/2017 10:37 AM *    Specimens:                  ID Type Source Tests Collected by Time Destination   A : endometrial crettings  Tissue Endometrial Curettings TISSUE EXAM Jake Mina MD 3/2/2017 0908          Drains: none          Findings: distorted endometrial cavity.      Complications: none    Dictated 10:45 AM  03/02/17        Jake Mina MD     Date: 3/2/2017  Time: 10:42 AM

## 2017-03-02 NOTE — OP NOTE
DATE OF OPERATION:  03/02/2017     PREOPERATIVE DIAGNOSES:   1.  Menometrorrhagia.   2.  Stenotic cervix.     POSTOPERATIVE DIAGNOSES:   1.  Menometrorrhagia.   2.  Stenotic cervix.     PROCEDURES PERFORMED:    1.  Diagnostic hysteroscopy.    2.  Dilatation and curettage.      SURGEON: Jake Mina MD      ASSISTANT: None.      ESTIMATED BLOOD LOSS: 2 mL.      COMPLICATIONS: None.     DRAINS: None.     SPECIMENS: Endometrial curettings.     ANESTHESIA: LMA.     FINDINGS: Very distorted endometrial cavity.     DESCRIPTION OF PROCEDURE: With the patient in the dorsal lithotomy position after LMA had been induced without difficulty, an examination under anesthesia was limited secondary to the patient's body habitus. A timeout was done. No antibiotics were given. She was prepped and draped.     An open Graves speculum was placed in the vagina and the anterior lip of the cervix was grasped with a single-tooth tenaculum. The uterus was sounded anteriorly to about 9 cm. I was able to finally dilate the endocervical canal with a small flexible endometrial biopsy pipette sufficiently to allow other dilators to use to dilate the endocervix sufficiently to admit the diagnostic hysteroscope.      Using sterile water as a visualization medium, the endometrial cavity was seen in its entirety. It was very distorted, undulating. I never did really see the tubal ostia, but it was very long and there apparently was a fibroid distorting part of it. I was able to obtain tissue, and this was sent for permanent section.      All instruments were removed. There was no evidence of uterine perforation or cervical laceration. She tolerated the procedure well and went to the recovery room in apparently good condition. All sponge and instrument counts were correct x3 according to the operating room personnel.      DISPOSITION:   1.  She was to be discharged home to return to the office in 2 weeks.   2.  Diet and activity as tolerated.    3.  She is to call for fever greater than 101, heavy vaginal bleeding, or increasing lower abdominal pain.    4.  She is to use ibuprofen over-the-counter as needed for cramps.   5.  Instructions and precautions were given.       Jake Mina M.D.*  [place]  D:  03/02/2017 10:45:09   T:  03/02/2017 11:08:36   Job ID:  [id number]   Document ID:  [id number]  cc:

## 2017-03-02 NOTE — H&P
ELENA Wahl  Obstetric History and Physical    No chief complaint on file.      Subjective     Patient is a 46 y.o. female No obstetric history on file. currently at [facility], who presents with chronic menorrhagia.  Pt has a large fibroid and an attempt at endometrial sampling in the office was unsuccessful due to a stenotic cervix.  Pt elected to proceed with dx hysteroscopy, D&C for endometrial sampling.  R/B/A explained to pt who verbalized her understanding.     Her prenatal care is benign.  Her previous obstetric/gynecological history is noted for is non-contributory.    The following portions of the patients history were reviewed and updated as appropriate: current medications, allergies, past medical history, past surgical history, past family history, past social history and problem list .       Prenatal Information:  Prenatal Results     The patient does not have an associated pregnancy episode and working [employer]. Some results will not display without a pregnancy episode and working [employer].        1st Trimester Ref. Range Date Time   CBC with auto diff       Rubella IgG       Hepatitis B SAg       RPR       ABO       Rh       Anibody Screen       HIV       Varicella IgG       Urinalysis with microscopy       Urine Culture       GC/Chlamydia/TV       ThinPrep/Pap       2nd and 3rd Trimester Ref. Range Date Time   Hemoglobin / Hematocrit       Hemoglobin       Group B Strep Culture       Glucose Challege Test 1 hour       Glucose Tolerance Test 3 hours       Pre-eclampsia Panel       Risk Screening Ref. Range Date Time   Fetal Fibronectin       Amnisure       Hepatitis C Antibody       Hemoglobin electrophoresis       Cystic Fibrosis       Hemoglobin A1C       MSAFP - 4       NIPT       AFP       Parvovirus IgG       Parvovirus IgM       POCT - glucose       Cesar-Sachs       24 Hour urine - Total protein       24 Hour urine - Creatinine clearance       Urinalysis with microscopy       Urine Culture       Drug  Screening Ref. Range Date Time   Amphetamine Screen       Barbiturate Screen       Benzodiazepine Screen       Methadone Screen       Phencyclidine Screen       Opiates Screen       THC Screen       Cocaine Screen       Amphetamine Screen       Propoxyphene Screen       Buprenorphine Screen       Methamphetamine Screen       Oxycodone Screen       Tryicyclic Antidepressants Screen              Legend: ^: Historical            View all results for this pregnancy             Past OB History:     Obstetric History     No data available          Past Medical History: Past Medical History   Diagnosis Date   • Anemia    • Arthritis      left shoulder   • Frequent headaches    • Hashimoto's disease      stopped meds and hasn't seen endo for 1 year   • Heartburn    • Low back pain    • Vertigo       Past Surgical History Past Surgical History   Procedure Laterality Date   • Prosser tooth extraction        Family History: Family History   Problem Relation Age of Onset   • COPD Father    • Cancer Maternal Aunt    • Heart disease Maternal Aunt    • Cancer Paternal Uncle    • Cancer Maternal Grandmother       Social History:  reports that she has been smoking Cigarettes.  She has a 3.00 pack-year smoking history. She has never used smokeless tobacco.   reports that she drinks alcohol.   reports that she does not use illicit drugs.        Review of Systems      Objective     Vital Signs Range for the last 24 hours  Temperature: Temp:  [98.5 °F (36.9 °C)] 98.5 °F (36.9 °C)   Temp Source: Temp src: Oral   BP: BP: (149)/(87) 149/87   Pulse: Heart Rate:  [76] 76   Respirations: Resp:  [16] 16   SPO2: SpO2:  [99 %] 99 %   O2 Amount (l/min):     O2 Devices     Weight: Weight:  [223 lb 12.8 oz (102 kg)] 223 lb 12.8 oz (102 kg)     Physical Examination: General appearance - alert, well appearing, and in no distress  Mental status - alert, oriented to person, place, and time  Chest - clear to auscultation, no wheezes, rales or rhonchi,  symmetric air entry  Heart - normal rate, regular rhythm, normal S1, S2, no murmurs, rubs, clicks or gallops  Abdomen - soft, nontender, nondistended, no masses or organomegaly  Back exam - full range of motion, no tenderness, palpable spasm or pain on motion  Neurological - alert, oriented, normal speech, no focal findings or movement disorder noted  Musculoskeletal - no joint tenderness, deformity or swelling  Extremities - peripheral pulses normal, no pedal edema, no clubbing or cyanosis  Skin - normal coloration and turgor, no rashes, no suspicious skin lesions noted        Laboratory Results: reviewed.  Radiology Review: none  Other Studies: none    Assessment/Plan     Active Problems:    * No active hospital problems. *      Assessment & Plan    Assessment:  Chronic menorrhagia  Stenotic cervix  Uterine fibroid    Plan:  1. Dx hysteroscopy, D&C  2. Plan of care has been reviewed with patient and family.  3.  Risks, benefits of treatment plan have been discussed.  4.  All questions have been answered.        Jake Mina MD  3/2/2017  8:49 AM

## 2017-03-03 ENCOUNTER — DOCUMENTATION (OUTPATIENT)
Dept: PHYSICAL THERAPY | Facility: HOSPITAL | Age: 47
End: 2017-03-03

## 2017-03-08 LAB
LAB AP CASE REPORT: NORMAL
Lab: NORMAL
PATH REPORT.FINAL DX SPEC: NORMAL

## 2017-03-09 ENCOUNTER — DOCUMENTATION (OUTPATIENT)
Dept: PHYSICAL THERAPY | Facility: HOSPITAL | Age: 47
End: 2017-03-09

## 2017-03-09 NOTE — THERAPY DISCHARGE NOTE
Outpatient Physical Therapy Discharge Summary         Patient Name: Юлия Burns  : 1970  MRN: [id number]    Today's Date: 3/9/2017    Visit Dx:  No diagnosis found.          PT OP Goals       17 1300       PT Short Term Goals    STG Date to Achieve 02/10/17  -LN     STG 1 Patient to have improved left ankle AROM to 3 degrees df and 16 degrees eversion (= to right) & painfree.   -LN     STG 1 Progress Not Met  -LN     STG 1 Progress Comments Had not been reassessed.   -LN     STG 2 Patient able to stand for 45 minutes to 1 hour with left foot pain no >3/10.   -LN     STG 2 Progress Not Met  -LN     STG 2 Progress Comments Patient still with decreased standing tolerance.  -LN     STG 3 Patient to have improved left ankle strength by 1/2 muscle grade for inversion and eversion.   -LN     STG 3 Progress Not Met  -LN     STG 3 Progress Comments had not been reassessed.   -LN     Long Term Goals    LTG Date to Achieve 17  -LN     LTG 1 Patient independent with HEP issued by therapist.  -LN     LTG 1 Progress Met  -LN     LTG 2 Patient to have improved left ankle strength to 4+-5/5 to avoid any future ankle & foot injuries.   -LN     LTG 2 Progress Not Met  -LN     LTG 3 Nominal tenderness noted lateral left foot.   -LN     LTG 3 Progress Not Met  -LN     LTG 4 Patient able to return to full duty at work and work a full day with left foot pain no > than 2/10.   -LN     LTG 4 Progress Not Met  -LN     Time Calculation    PT Goal Re-Cert Due Date 17  -LN       User Key  (r) = Recorded By, (t) = Taken By, (c) = Cosigned By    Initials Name Provider Type    LIZ Weinstein, PT Physical Therapist          OP PT Discharge Summary  Date of Discharge: 17  Reason for Discharge: other (comment) (Patient did not schedule any further visits and no further word from patient or MD. Patient only came for 5 visits . )  Outcomes Achieved: Patient able to partially acheive established  goals ( had not reassessed patient prior to when she stopped coming to PT).   Discharge Instructions: Patient had been given a HEP to do at home.       Time Calculation:                    Meli Weinstein, PT  3/9/2017

## 2017-03-25 ENCOUNTER — HOSPITAL ENCOUNTER (EMERGENCY)
Facility: HOSPITAL | Age: 47
Discharge: HOME | End: 2017-03-25
Attending: EMERGENCY MEDICINE | Admitting: EMERGENCY MEDICINE

## 2017-03-25 ENCOUNTER — APPOINTMENT (OUTPATIENT)
Dept: CT IMAGING | Facility: HOSPITAL | Age: 47
End: 2017-03-25

## 2017-03-25 VITALS
HEART RATE: 75 BPM | HEIGHT: 66 IN | OXYGEN SATURATION: 100 % | WEIGHT: 230 LBS | SYSTOLIC BLOOD PRESSURE: 134 MMHG | DIASTOLIC BLOOD PRESSURE: 79 MMHG | RESPIRATION RATE: 18 BRPM | TEMPERATURE: 98.6 F | BODY MASS INDEX: 36.96 KG/M2

## 2017-03-25 DIAGNOSIS — R10.2 PELVIC PAIN: Primary | ICD-10-CM

## 2017-03-25 DIAGNOSIS — D25.9 UTERINE LEIOMYOMA, UNSPECIFIED LOCATION: ICD-10-CM

## 2017-03-25 DIAGNOSIS — N39.0 ACUTE UTI: ICD-10-CM

## 2017-03-25 DIAGNOSIS — D64.9 ANEMIA, UNSPECIFIED TYPE: ICD-10-CM

## 2017-03-25 LAB
ALBUMIN SERPL-MCNC: 4 G/DL (ref 3.5–5.2)
ALBUMIN/GLOB SERPL: 1 G/DL
ALP SERPL-CCNC: 66 U/L (ref 40–129)
ALT SERPL W P-5'-P-CCNC: 7 U/L (ref 5–33)
AMORPH URATE CRY URNS QL MICRO: ABNORMAL /HPF
ANION GAP SERPL CALCULATED.3IONS-SCNC: 15.8 MMOL/L
ANISOCYTOSIS BLD QL: NORMAL
AST SERPL-CCNC: 12 U/L (ref 5–32)
BACTERIA UR QL AUTO: ABNORMAL /HPF
BASOPHILS # BLD AUTO: 0.01 10*3/MM3 (ref 0–0.2)
BASOPHILS NFR BLD AUTO: 0.1 % (ref 0–2)
BILIRUB SERPL-MCNC: 0.5 MG/DL (ref 0.2–1.2)
BILIRUB UR QL STRIP: NEGATIVE
BUN BLD-MCNC: 10 MG/DL (ref 6–20)
BUN/CREAT SERPL: 10.2 (ref 7–25)
CALCIUM SPEC-SCNC: 9.2 MG/DL (ref 8.6–10.5)
CHLORIDE SERPL-SCNC: 100 MMOL/L (ref 98–107)
CLARITY UR: ABNORMAL
CO2 SERPL-SCNC: 20.2 MMOL/L (ref 22–29)
COLOR UR: YELLOW
CREAT BLD-MCNC: 0.98 MG/DL (ref 0.57–1)
DEPRECATED RDW RBC AUTO: 43.1 FL (ref 37–54)
EOSINOPHIL # BLD AUTO: 0.01 10*3/MM3 (ref 0.1–0.3)
EOSINOPHIL NFR BLD AUTO: 0.1 % (ref 0–4)
ERYTHROCYTE [DISTWIDTH] IN BLOOD BY AUTOMATED COUNT: 16.4 % (ref 11.5–14.5)
GFR SERPL CREATININE-BSD FRML MDRD: 74 ML/MIN/1.73
GLOBULIN UR ELPH-MCNC: 3.9 GM/DL
GLUCOSE BLD-MCNC: 89 MG/DL (ref 65–99)
GLUCOSE UR STRIP-MCNC: NEGATIVE MG/DL
HCG SERPL QL: NEGATIVE
HCT VFR BLD AUTO: 29.2 % (ref 37–47)
HGB BLD-MCNC: 8.5 G/DL (ref 12–16)
HGB UR QL STRIP.AUTO: NEGATIVE
HYALINE CASTS UR QL AUTO: ABNORMAL /LPF
HYPOCHROMIA BLD QL: NORMAL
IMM GRANULOCYTES # BLD: 0.02 10*3/MM3 (ref 0–0.03)
IMM GRANULOCYTES NFR BLD: 0.2 % (ref 0–0.5)
KETONES UR QL STRIP: NEGATIVE
LEUKOCYTE ESTERASE UR QL STRIP.AUTO: ABNORMAL
LIPASE SERPL-CCNC: 17 U/L (ref 13–60)
LYMPHOCYTES # BLD AUTO: 0.97 10*3/MM3 (ref 0.6–4.8)
LYMPHOCYTES NFR BLD AUTO: 10.1 % (ref 20–45)
MCH RBC QN AUTO: 21.3 PG (ref 27–31)
MCHC RBC AUTO-ENTMCNC: 29.1 G/DL (ref 31–37)
MCV RBC AUTO: 73.2 FL (ref 81–99)
MONOCYTES # BLD AUTO: 0.61 10*3/MM3 (ref 0–1)
MONOCYTES NFR BLD AUTO: 6.4 % (ref 3–8)
MUCOUS THREADS URNS QL MICRO: ABNORMAL /HPF
NEUTROPHILS # BLD AUTO: 7.96 10*3/MM3 (ref 1.5–8.3)
NEUTROPHILS NFR BLD AUTO: 83.1 % (ref 45–70)
NITRITE UR QL STRIP: NEGATIVE
NRBC BLD MANUAL-RTO: 0 /100 WBC (ref 0–0)
PH UR STRIP.AUTO: 6 [PH] (ref 4.5–8)
PLAT MORPH BLD: NORMAL
PLATELET # BLD AUTO: 307 10*3/MM3 (ref 140–500)
PMV BLD AUTO: 10.2 FL (ref 7.4–10.4)
POTASSIUM BLD-SCNC: 4.1 MMOL/L (ref 3.5–5.2)
PROT SERPL-MCNC: 7.9 G/DL (ref 6–8.5)
PROT UR QL STRIP: ABNORMAL
RBC # BLD AUTO: 3.99 10*6/MM3 (ref 4.2–5.4)
RBC # UR: ABNORMAL /HPF
REF LAB TEST METHOD: ABNORMAL
SODIUM BLD-SCNC: 136 MMOL/L (ref 136–145)
SP GR UR STRIP: 1.01 (ref 1–1.03)
SQUAMOUS #/AREA URNS HPF: ABNORMAL /HPF
UROBILINOGEN UR QL STRIP: ABNORMAL
WBC MORPH BLD: NORMAL
WBC NRBC COR # BLD: 9.58 10*3/MM3 (ref 4.8–10.8)
WBC UR QL AUTO: ABNORMAL /HPF

## 2017-03-25 PROCEDURE — 99284 EMERGENCY DEPT VISIT MOD MDM: CPT

## 2017-03-25 PROCEDURE — 87086 URINE CULTURE/COLONY COUNT: CPT | Performed by: EMERGENCY MEDICINE

## 2017-03-25 PROCEDURE — 80053 COMPREHEN METABOLIC PANEL: CPT | Performed by: EMERGENCY MEDICINE

## 2017-03-25 PROCEDURE — 96374 THER/PROPH/DIAG INJ IV PUSH: CPT

## 2017-03-25 PROCEDURE — 85007 BL SMEAR W/DIFF WBC COUNT: CPT | Performed by: EMERGENCY MEDICINE

## 2017-03-25 PROCEDURE — 81001 URINALYSIS AUTO W/SCOPE: CPT | Performed by: EMERGENCY MEDICINE

## 2017-03-25 PROCEDURE — 74177 CT ABD & PELVIS W/CONTRAST: CPT

## 2017-03-25 PROCEDURE — 0 IOPAMIDOL PER 1 ML: Performed by: EMERGENCY MEDICINE

## 2017-03-25 PROCEDURE — 85025 COMPLETE CBC W/AUTO DIFF WBC: CPT | Performed by: EMERGENCY MEDICINE

## 2017-03-25 PROCEDURE — 25010000002 HYDROMORPHONE PER 4 MG: Performed by: EMERGENCY MEDICINE

## 2017-03-25 PROCEDURE — 99284 EMERGENCY DEPT VISIT MOD MDM: CPT | Performed by: EMERGENCY MEDICINE

## 2017-03-25 PROCEDURE — 83690 ASSAY OF LIPASE: CPT | Performed by: EMERGENCY MEDICINE

## 2017-03-25 PROCEDURE — 84703 CHORIONIC GONADOTROPIN ASSAY: CPT | Performed by: EMERGENCY MEDICINE

## 2017-03-25 RX ORDER — CEFDINIR 250 MG/5ML
250 POWDER, FOR SUSPENSION ORAL 2 TIMES DAILY
Qty: 70 ML | Refills: 0 | Status: SHIPPED | OUTPATIENT
Start: 2017-03-25 | End: 2017-04-01

## 2017-03-25 RX ORDER — SODIUM CHLORIDE 0.9 % (FLUSH) 0.9 %
10 SYRINGE (ML) INJECTION AS NEEDED
Status: DISCONTINUED | OUTPATIENT
Start: 2017-03-25 | End: 2017-03-25 | Stop reason: HOSPADM

## 2017-03-25 RX ORDER — CEPHALEXIN 500 MG/1
CAPSULE ORAL
Qty: 40 CAPSULE | Refills: 0 | Status: SHIPPED | OUTPATIENT
Start: 2017-03-25 | End: 2017-03-25 | Stop reason: HOSPADM

## 2017-03-25 RX ORDER — HYDROCODONE BITARTRATE AND ACETAMINOPHEN 5; 325 MG/1; MG/1
1 TABLET ORAL ONCE
Status: DISCONTINUED | OUTPATIENT
Start: 2017-03-25 | End: 2017-03-25 | Stop reason: HOSPADM

## 2017-03-25 RX ORDER — CEPHALEXIN 500 MG/1
500 CAPSULE ORAL ONCE
Status: COMPLETED | OUTPATIENT
Start: 2017-03-25 | End: 2017-03-25

## 2017-03-25 RX ORDER — HYDROCODONE BITARTRATE AND ACETAMINOPHEN 5; 325 MG/1; MG/1
TABLET ORAL
Qty: 20 TABLET | Refills: 0 | Status: SHIPPED | OUTPATIENT
Start: 2017-03-25

## 2017-03-25 RX ORDER — HYDROCODONE BITARTRATE AND ACETAMINOPHEN 5; 325 MG/1; MG/1
1 TABLET ORAL EVERY 6 HOURS PRN
COMMUNITY

## 2017-03-25 RX ADMIN — HYDROMORPHONE HYDROCHLORIDE 0.5 MG: 1 INJECTION, SOLUTION INTRAMUSCULAR; INTRAVENOUS; SUBCUTANEOUS at 14:16

## 2017-03-25 RX ADMIN — IOPAMIDOL 100 ML: 755 INJECTION, SOLUTION INTRAVENOUS at 16:41

## 2017-03-25 RX ADMIN — CEPHALEXIN 500 MG: 500 CAPSULE ORAL at 17:08

## 2017-03-25 NOTE — ED PROVIDER NOTES
"Subjective   History of Present Illness  History of Present Illness    Chief complaint: Abdominal pain    Location: Right lower quadrant    Quality/Severity:  \"Cramping\", moderately severe    Timing/Duration: Worsening over 3 days    Modifying Factors: Worse with palpation of the abdomen    Associated Symptoms: Denies dysuria, hematuria, vaginal bleeding and discharge    Narrative: 46-year-old female seen in the urgent treatment center and transferred to the emergency department for further evaluation presents today for evaluation of right lower quadrant pain.  She is 3 weeks status post D&C for dysfunctional uterine bleeding with a history of fibroids.  She states that she's been doing well for the last 2-1/2 weeks but 3 days ago developed cramping in the right lower quadrant with worsening discomfort.  She denies fever, change in bowel habits, nausea and vomiting.    Review of Systems  All other systems reviewed and are otherwise negative.  Past Medical History:   Diagnosis Date   • Anemia    • Arthritis     left shoulder   • Frequent headaches    • Hashimoto's disease     stopped meds and hasn't seen endo for 1 year   • Heartburn    • Low back pain    • Vertigo        No Known Allergies    Past Surgical History:   Procedure Laterality Date   • D&C HYSTEROSCOPY N/A 3/2/2017    Procedure:  DIAGNOSTIC HYSTEROSCOPY DILATATION AND  CURETTAGE;  Surgeon: Jake Mina MD;  Location: [facility] OR;  Service:    • WISDOM TOOTH EXTRACTION         Family History   Problem Relation Age of Onset   • COPD Father    • Cancer Maternal Aunt    • Heart disease Maternal Aunt    • Cancer Paternal Uncle    • Cancer Maternal Grandmother        Social History     Social History   • Marital status: Single     Spouse name: N/A   • Number of children: N/A   • Years of education: N/A     Social History Main Topics   • Smoking status: Former Smoker     Packs/day: 1.00     Years: 3.00     Types: Cigarettes   • Smokeless tobacco: " Never Used   • Alcohol use Yes      Comment: 2 drinks / week   • Drug use: No   • Sexual activity: Defer     Other Topics Concern   • None     Social History Narrative   • None       ED Triage Vitals   Temp Heart Rate Resp BP SpO2   03/25/17 1248 03/25/17 1248 03/25/17 1248 03/25/17 1248 03/25/17 1248   98.6 °F (37 °C) 75 18 134/79 100 %      Temp src Heart Rate Source Patient Position BP Location FiO2 (%)   03/25/17 1248 03/25/17 1248 03/25/17 1248 03/25/17 1248 --   Oral Monitor Sitting Right arm        Objective   Physical Exam   Constitutional: She is oriented to person, place, and time. She appears well-developed. No distress.   HENT:   Head: Normocephalic.   Mouth/Throat: Oropharynx is clear and moist.   Eyes: Conjunctivae are normal. No scleral icterus.   Neck: Neck supple.   Painless movement   Cardiovascular: Normal rate and regular rhythm.    Pulmonary/Chest: Effort normal and breath sounds normal. No respiratory distress.   Abdominal: Soft. Tenderness: there is tenderness of the right lower quadrant with some guarding.  Abdomen is soft without rigidity.   Large habitus   Genitourinary:   Genitourinary Comments: Pelvic Exam:  External exam- normal  Speculum exam- no vaginal bleeding; no discharge  Bimanual exam-no adnexal tenderness; no cervical motion tenderness   Musculoskeletal:   MAEE, normal strength   Neurological: She is alert and oriented to person, place, and time.   Skin: Skin is warm and dry.   Psychiatric: She has a normal mood and affect. Thought content normal.   Nursing note and vitals reviewed.      Procedures  Results for orders placed or performed during the hospital encounter of 03/25/17   Comprehensive Metabolic Panel   Result Value Ref Range    Glucose 89 65 - 99 mg/dL    BUN 10 6 - 20 mg/dL    Creatinine 0.98 0.57 - 1.00 mg/dL    Sodium 136 136 - 145 mmol/L    Potassium 4.1 3.5 - 5.2 mmol/L    Chloride 100 98 - 107 mmol/L    CO2 20.2 (L) 22.0 - 29.0 mmol/L    Calcium 9.2 8.6 - 10.5  mg/dL    Total Protein 7.9 6.0 - 8.5 g/dL    Albumin 4.00 3.50 - 5.20 g/dL    ALT (SGPT) 7 5 - 33 U/L    AST (SGOT) 12 5 - 32 U/L    Alkaline Phosphatase 66 40 - 129 U/L    Total Bilirubin 0.5 0.2 - 1.2 mg/dL    eGFR  African Amer 74 >60 mL/min/1.73    Globulin 3.9 gm/dL    A/G Ratio 1.0 g/dL    BUN/Creatinine Ratio 10.2 7.0 - 25.0    Anion Gap 15.8 mmol/L   Lipase   Result Value Ref Range    Lipase 17 13 - 60 U/L   hCG, Serum, Qualitative   Result Value Ref Range    HCG Qualitative Negative Negative   Urinalysis With / Culture If Indicated   Result Value Ref Range    Color, UA Yellow Yellow, Straw    Appearance, UA Slightly Cloudy (A) Clear    pH, UA 6.0 4.5 - 8.0    Specific Gravity, UA 1.015 1.003 - 1.030    Glucose, UA Negative Negative    Ketones, UA Negative Negative, 80 mg/dL (3+), >=160 mg/dL (4+)    Bilirubin, UA Negative Negative    Blood, UA Negative Negative    Protein, UA 30 mg/dL (1+) (A) Negative    Leuk Esterase, UA Large (3+) (A) Negative    Nitrite, UA Negative Negative    Urobilinogen, UA 0.2 E.U./dL 0.2 - 1.0 E.U./dL   CBC Auto Differential   Result Value Ref Range    WBC 9.58 4.80 - 10.80 10*3/mm3    RBC 3.99 (L) 4.20 - 5.40 10*6/mm3    Hemoglobin 8.5 (L) 12.0 - 16.0 g/dL    Hematocrit 29.2 (L) 37.0 - 47.0 %    MCV 73.2 (L) 81.0 - 99.0 fL    MCH 21.3 (L) 27.0 - 31.0 pg    MCHC 29.1 (L) 31.0 - 37.0 g/dL    RDW 16.4 (H) 11.5 - 14.5 %    RDW-SD 43.1 37.0 - 54.0 fl    MPV 10.2 7.4 - 10.4 fL    Platelets 307 140 - 500 10*3/mm3    Neutrophil % 83.1 (H) 45.0 - 70.0 %    Lymphocyte % 10.1 (L) 20.0 - 45.0 %    Monocyte % 6.4 3.0 - 8.0 %    Eosinophil % 0.1 0.0 - 4.0 %    Basophil % 0.1 0.0 - 2.0 %    Immature Grans % 0.2 0.0 - 0.5 %    Neutrophils, Absolute 7.96 1.50 - 8.30 10*3/mm3    Lymphocytes, Absolute 0.97 0.60 - 4.80 10*3/mm3    Monocytes, Absolute 0.61 0.00 - 1.00 10*3/mm3    Eosinophils, Absolute 0.01 (L) 0.10 - 0.30 10*3/mm3    Basophils, Absolute 0.01 0.00 - 0.20 10*3/mm3    Immature Grans,  Absolute 0.02 0.00 - 0.03 10*3/mm3    nRBC 0.0 0.0 - 0.0 /100 WBC   Scan Slide   Result Value Ref Range    Anisocytosis Slight/1+ None Seen    Hypochromia Slight/1+ None Seen    WBC Morphology Normal Normal    Platelet Morphology Normal Normal   Urinalysis, Microscopic Only   Result Value Ref Range    RBC, UA 0-2 (A) None Seen /HPF    WBC, UA 6-12 (A) None Seen /HPF    Bacteria, UA 1+ (A) None Seen /HPF    Squamous Epithelial Cells, UA 13-20 (A) None Seen, 0-2 /HPF    Hyaline Casts, UA None Seen None Seen /LPF    Amorphous Crystals, UA Small/1+ None Seen /HPF    Mucus, UA Trace None Seen, Trace /HPF    Methodology Manual Light Microscopy      RADIOLOGY        Study: CT abdomen pelvis with IV contrast    Findings: 2 large uterine masses both low density probably represent fibroids.  No extrauterine pathology.  Trace free fluid    Interpreted Contemporaneously by Dr. MACK Anglin-radiologist           ED Course  ED Course   Value Comment By Time   Hemoglobin: (!) 8.5 Results for JONATHAN RAMSAY (MRN [id number]) as of 3/25/2017 15:30    2/28/2017 15:11  Hemoglobin: 7.6 (L)    3/25/2017 13:54  Hemoglobin: 8.5 (L) Pawel Anglin MD 03/25 3100    Reviewed labs, CT result with patient.  Recommend close outpatient follow-up with Dr. Mina.  Patient presses understanding agreement with plan.  We did review red flags for ED return. Pawel Anglin MD 03/25 4426                  [facility]    Final diagnoses:   Pelvic pain   Acute UTI   Uterine leiomyoma, unspecified location   Anemia, unspecified type              Medication List      New Prescriptions          cephalexin 500 MG capsule   Commonly known as:  KEFLEX   Take one tablet by mouth 4 times daily         Changed          HYDROcodone-acetaminophen 5-325 MG per tablet   Commonly known as:  NORCO   Take 1-2 tabs by mouth every 4-6 hours as needed for pain   What changed:    - how much to take  - how to take this  - when to take this  - reasons to take this  - additional  instructions         Stop          acetaminophen 500 MG tablet   Commonly known as:  TYLENOL       B-12 PO                  Pawel Anglin MD  03/25/17 5240    Pt prefers liquid abx.         Pawel Anglin MD  03/25/17 8807

## 2017-03-27 LAB — BACTERIA SPEC AEROBE CULT: NO GROWTH

## 2017-06-13 ENCOUNTER — OFFICE VISIT (OUTPATIENT)
Dept: OBSTETRICS AND GYNECOLOGY | Facility: CLINIC | Age: 47
End: 2017-06-13

## 2017-06-13 VITALS
BODY MASS INDEX: 35.07 KG/M2 | DIASTOLIC BLOOD PRESSURE: 78 MMHG | WEIGHT: 218.2 LBS | SYSTOLIC BLOOD PRESSURE: 128 MMHG | HEIGHT: 66 IN

## 2017-06-13 DIAGNOSIS — N94.6 DYSMENORRHEA: ICD-10-CM

## 2017-06-13 DIAGNOSIS — D21.9 LEIOMYOMA: Primary | ICD-10-CM

## 2017-06-13 PROCEDURE — 99213 OFFICE O/P EST LOW 20 MIN: CPT | Performed by: OBSTETRICS & GYNECOLOGY

## 2017-06-13 PROCEDURE — 96372 THER/PROPH/DIAG INJ SC/IM: CPT | Performed by: OBSTETRICS & GYNECOLOGY

## 2017-06-13 RX ADMIN — MEDROXYPROGESTERONE ACETATE 150 MG: 150 INJECTION, SUSPENSION INTRAMUSCULAR at 08:45

## 2017-06-13 NOTE — PROGRESS NOTES
"Patient Care Team:  No Known Provider as PCP - General    Patient new to practice? no  Patient new to me? no    Chief Complaint:   Chief Complaint   Patient presents with   • Follow-up     D&C 3 months ago and need depo injection        HPI: History taken from: patient            No LMP recorded.           Pt states that she had severe pain after depo.           Nature: severe           Severity: 5/10 when cramping.            Context: when she is bleeding.                    Duration: not noticed.            Timing: na           Modifying factors: na           Associated signs and symptoms: none           Patient Denies:  Fever.                       Chronic conditions:   Patient Active Problem List   Diagnosis   • Dysmenorrhea   • Leiomyoma         Review of Systems   Constitutional: Negative.    HENT: Negative.    Eyes: Negative.    Respiratory: Negative.    Cardiovascular: Negative.    Gastrointestinal: Negative.    Endocrine: Negative.    Genitourinary: Positive for pelvic pain.   Musculoskeletal: Negative.    Skin: Negative.    Allergic/Immunologic: Negative.    Neurological: Negative.    Hematological: Negative.    Psychiatric/Behavioral: Negative.    All other systems reviewed and are negative.        Social History:  Smoker:  no.  Counseling given: Not Answered  . .                                Alcohol: no                               Recreational drugs: no      Family History   Problem Relation Age of Onset   • COPD Father    • Cancer Maternal Aunt    • Heart disease Maternal Aunt    • Cancer Paternal Uncle    • Cancer Maternal Grandmother        Objective    Vital Signs  BP: (128)/(78) 128/78    Flowsheet Rows         First Filed Value    Admission Height  66\" (167.6 cm) Documented at 06/13/2017 1612    Admission Weight  218 lb 3.2 oz (99 kg) Documented at 06/13/2017 1612          Physical Exam:    Physical Exam   Constitutional: She is oriented to person, place, and time. She appears well-developed " and well-nourished.   HENT:   Head: Normocephalic and atraumatic.   Abdominal: Soft.   Neurological: She is alert and oriented to person, place, and time.   Skin: Skin is warm and dry.   Psychiatric: She has a normal mood and affect. Her behavior is normal. Judgment and thought content normal.   Nursing note and vitals reviewed.      Results Review:     I reviewed the patient's new clinical results.    Lab Results (last 24 hours)     ** No results found for the last 24 hours. **          Imaging Results (last 24 hours)     ** No results found for the last 24 hours. **            ECG/EMG Results (most recent)     None          Medication Review:   I have reviewed the patient's current medication list    Current Outpatient Prescriptions:   •  acetaminophen (TYLENOL) 500 MG tablet, Take 500 mg by mouth Every 4 (Four) Hours As Needed for mild pain (1-3)., Disp: , Rfl:   •  Cyanocobalamin (B-12 PO), Take 1 tablet by mouth Daily., Disp: , Rfl:   •  HYDROcodone-acetaminophen (NORCO) 5-325 MG per tablet, Take 1 tablet by mouth Every 6 (Six) Hours As Needed. ? Old rx  Helped a little with cramps yesterday., Disp: , Rfl:   •  HYDROcodone-acetaminophen (NORCO) 5-325 MG per tablet, Take 1-2 tabs by mouth every 4-6 hours as needed for pain, Disp: 20 tablet, Rfl: 0  •  ibuprofen (ADVIL,MOTRIN) 800 MG tablet, Take 800 mg by mouth Every 6 (Six) Hours As Needed for mild pain (1-3)., Disp: , Rfl:       Plan for disposition:    Юлия was seen today for follow-up.    Diagnoses and all orders for this visit:    Leiomyoma    Dysmenorrhea      Pt desires expectant management.  If cramps worsen on depo, since pt had normal tissue from D&C, may be a candidate for endometrial ablation.    RTO Return if symptoms worsen or fail to improve.    Jake Mina MD    06/13/17  4:42 PM

## 2017-06-14 RX ORDER — MEDROXYPROGESTERONE ACETATE 150 MG/ML
150 INJECTION, SUSPENSION INTRAMUSCULAR ONCE
Status: COMPLETED | OUTPATIENT
Start: 2017-06-14 | End: 2017-06-13

## 2017-08-08 ENCOUNTER — TREATMENT (OUTPATIENT)
Dept: PHYSICAL THERAPY | Facility: CLINIC | Age: 47
End: 2017-08-08

## 2017-08-08 PROCEDURE — PDS: Performed by: PHYSICAL THERAPIST

## 2024-03-20 ENCOUNTER — TRANSCRIBE ORDERS (OUTPATIENT)
Dept: ADMINISTRATIVE | Facility: HOSPITAL | Age: 54
End: 2024-03-20

## 2024-03-20 DIAGNOSIS — Z12.31 ENCOUNTER FOR SCREENING MAMMOGRAM FOR MALIGNANT NEOPLASM OF BREAST: Primary | ICD-10-CM

## 2024-03-28 ENCOUNTER — HOSPITAL ENCOUNTER (OUTPATIENT)
Dept: MAMMOGRAPHY | Facility: HOSPITAL | Age: 54
Discharge: HOME | End: 2024-03-28
Admitting: NURSE PRACTITIONER
Payer: COMMERCIAL

## 2024-03-28 DIAGNOSIS — Z12.31 ENCOUNTER FOR SCREENING MAMMOGRAM FOR MALIGNANT NEOPLASM OF BREAST: ICD-10-CM

## 2024-03-28 PROCEDURE — 77067 SCR MAMMO BI INCL CAD: CPT

## 2024-03-28 PROCEDURE — 77063 BREAST TOMOSYNTHESIS BI: CPT

## (undated) DEVICE — CYSTO/BLADDER IRRIGATION SET, REGULATING CLAMP

## (undated) DEVICE — LAG PERI GYN: Brand: MEDLINE INDUSTRIES, INC.

## (undated) DEVICE — Device: Brand: LIGHT GUARD™ FLEXIBLE LIGHT HANDLE COVER (1 EACH/PKG)

## (undated) DEVICE — GOWN,PREVENTION PLUS,XXLARGE,STERILE: Brand: MEDLINE

## (undated) DEVICE — PIPET CURET 3MM

## (undated) DEVICE — GLV SURG SENSICARE W/ALOE PF LF 7.5 STRL